# Patient Record
Sex: MALE | Race: WHITE | NOT HISPANIC OR LATINO | Employment: FULL TIME | URBAN - METROPOLITAN AREA
[De-identification: names, ages, dates, MRNs, and addresses within clinical notes are randomized per-mention and may not be internally consistent; named-entity substitution may affect disease eponyms.]

---

## 2017-11-15 ENCOUNTER — HOSPITAL ENCOUNTER (OUTPATIENT)
Dept: RADIOLOGY | Facility: HOSPITAL | Age: 42
Discharge: HOME | End: 2017-11-15
Attending: INTERNAL MEDICINE

## 2017-11-15 ENCOUNTER — OFFICE VISIT - HEALTHEAST (OUTPATIENT)
Dept: RHEUMATOLOGY | Facility: CLINIC | Age: 42
End: 2017-11-15

## 2017-11-15 DIAGNOSIS — M05.79 SEROPOSITIVE RHEUMATOID ARTHRITIS OF MULTIPLE SITES (H): ICD-10-CM

## 2017-11-15 LAB
ALT SERPL W P-5'-P-CCNC: 18 U/L (ref 0–45)
CREAT SERPL-MCNC: 1.05 MG/DL (ref 0.7–1.3)
GFR SERPL CREATININE-BSD FRML MDRD: >60 ML/MIN/1.73M2

## 2017-11-16 LAB
ANA SER QL: 0.3 U
HBV SURFACE AG SERPL QL IA: NEGATIVE
HCV AB SERPL QL IA: NEGATIVE

## 2017-11-20 ENCOUNTER — RECORDS - HEALTHEAST (OUTPATIENT)
Dept: ADMINISTRATIVE | Facility: OTHER | Age: 42
End: 2017-11-20

## 2018-03-06 ENCOUNTER — OFFICE VISIT - HEALTHEAST (OUTPATIENT)
Dept: RHEUMATOLOGY | Facility: CLINIC | Age: 43
End: 2018-03-06

## 2018-03-06 DIAGNOSIS — M19.232 OTHER SECONDARY OSTEOARTHRITIS OF LEFT WRIST: ICD-10-CM

## 2018-03-06 DIAGNOSIS — M05.79 SEROPOSITIVE RHEUMATOID ARTHRITIS OF MULTIPLE SITES (H): ICD-10-CM

## 2018-03-06 DIAGNOSIS — Z79.899 HIGH RISK MEDICATION USE: ICD-10-CM

## 2018-03-06 ASSESSMENT — MIFFLIN-ST. JEOR: SCORE: 1870.09

## 2018-03-12 ENCOUNTER — COMMUNICATION - HEALTHEAST (OUTPATIENT)
Dept: ADMINISTRATIVE | Facility: CLINIC | Age: 43
End: 2018-03-12

## 2018-09-06 ENCOUNTER — COMMUNICATION - HEALTHEAST (OUTPATIENT)
Dept: RHEUMATOLOGY | Facility: CLINIC | Age: 43
End: 2018-09-06

## 2018-09-06 DIAGNOSIS — M05.79 SEROPOSITIVE RHEUMATOID ARTHRITIS OF MULTIPLE SITES (H): ICD-10-CM

## 2018-10-18 ENCOUNTER — OFFICE VISIT - HEALTHEAST (OUTPATIENT)
Dept: RHEUMATOLOGY | Facility: CLINIC | Age: 43
End: 2018-10-18

## 2018-10-18 DIAGNOSIS — M19.232 OTHER SECONDARY OSTEOARTHRITIS OF LEFT WRIST: ICD-10-CM

## 2018-10-18 DIAGNOSIS — Z79.899 HIGH RISK MEDICATION USE: ICD-10-CM

## 2018-10-18 DIAGNOSIS — M06.00 SERONEGATIVE RHEUMATOID ARTHRITIS (H): ICD-10-CM

## 2018-11-08 ENCOUNTER — AMBULATORY - HEALTHEAST (OUTPATIENT)
Dept: RHEUMATOLOGY | Facility: CLINIC | Age: 43
End: 2018-11-08

## 2018-11-08 ENCOUNTER — AMBULATORY - HEALTHEAST (OUTPATIENT)
Dept: LAB | Facility: CLINIC | Age: 43
End: 2018-11-08

## 2018-11-08 DIAGNOSIS — M06.00 SERONEGATIVE RHEUMATOID ARTHRITIS (H): ICD-10-CM

## 2018-11-08 LAB
ALBUMIN SERPL-MCNC: 3.9 G/DL (ref 3.5–5)
ALT SERPL W P-5'-P-CCNC: 28 U/L (ref 0–45)
CREAT SERPL-MCNC: 1.02 MG/DL (ref 0.7–1.3)
ERYTHROCYTE [DISTWIDTH] IN BLOOD BY AUTOMATED COUNT: 13.5 % (ref 11–14.5)
GFR SERPL CREATININE-BSD FRML MDRD: >60 ML/MIN/1.73M2
HCT VFR BLD AUTO: 42.4 % (ref 40–54)
HGB BLD-MCNC: 14.7 G/DL (ref 14–18)
MCH RBC QN AUTO: 30.7 PG (ref 27–34)
MCHC RBC AUTO-ENTMCNC: 34.7 G/DL (ref 32–36)
MCV RBC AUTO: 89 FL (ref 80–100)
PLATELET # BLD AUTO: 234 THOU/UL (ref 140–440)
PMV BLD AUTO: 10.7 FL (ref 8.5–12.5)
RBC # BLD AUTO: 4.79 MILL/UL (ref 4.4–6.2)
WBC: 8.3 THOU/UL (ref 4–11)

## 2018-12-19 ENCOUNTER — OFFICE VISIT - HEALTHEAST (OUTPATIENT)
Dept: RHEUMATOLOGY | Facility: CLINIC | Age: 43
End: 2018-12-19

## 2018-12-19 DIAGNOSIS — M05.79 SEROPOSITIVE RHEUMATOID ARTHRITIS OF MULTIPLE SITES (H): ICD-10-CM

## 2018-12-19 DIAGNOSIS — M19.232 OTHER SECONDARY OSTEOARTHRITIS OF LEFT WRIST: ICD-10-CM

## 2018-12-19 DIAGNOSIS — M06.00 SERONEGATIVE RHEUMATOID ARTHRITIS (H): ICD-10-CM

## 2018-12-19 DIAGNOSIS — Z79.899 HIGH RISK MEDICATION USE: ICD-10-CM

## 2019-01-15 ENCOUNTER — OFFICE VISIT - HEALTHEAST (OUTPATIENT)
Dept: RHEUMATOLOGY | Facility: CLINIC | Age: 44
End: 2019-01-15

## 2019-01-15 DIAGNOSIS — M19.232 OTHER SECONDARY OSTEOARTHRITIS OF LEFT WRIST: ICD-10-CM

## 2019-01-15 DIAGNOSIS — Z79.899 HIGH RISK MEDICATION USE: ICD-10-CM

## 2019-01-15 DIAGNOSIS — M06.00 SERONEGATIVE RHEUMATOID ARTHRITIS (H): ICD-10-CM

## 2019-01-16 ENCOUNTER — COMMUNICATION - HEALTHEAST (OUTPATIENT)
Dept: RHEUMATOLOGY | Facility: CLINIC | Age: 44
End: 2019-01-16

## 2019-01-16 DIAGNOSIS — M06.00 SERONEGATIVE RHEUMATOID ARTHRITIS (H): ICD-10-CM

## 2019-03-04 ENCOUNTER — COMMUNICATION - HEALTHEAST (OUTPATIENT)
Dept: ADMINISTRATIVE | Facility: CLINIC | Age: 44
End: 2019-03-04

## 2019-05-23 ENCOUNTER — OFFICE VISIT - HEALTHEAST (OUTPATIENT)
Dept: RHEUMATOLOGY | Facility: CLINIC | Age: 44
End: 2019-05-23

## 2019-05-23 DIAGNOSIS — Z79.899 HIGH RISK MEDICATION USE: ICD-10-CM

## 2019-05-23 DIAGNOSIS — M06.00 SERONEGATIVE RHEUMATOID ARTHRITIS (H): ICD-10-CM

## 2019-05-23 DIAGNOSIS — M19.232 OTHER SECONDARY OSTEOARTHRITIS OF LEFT WRIST: ICD-10-CM

## 2019-10-08 ENCOUNTER — OFFICE VISIT - HEALTHEAST (OUTPATIENT)
Dept: RHEUMATOLOGY | Facility: CLINIC | Age: 44
End: 2019-10-08

## 2019-10-08 DIAGNOSIS — M06.00 SERONEGATIVE RHEUMATOID ARTHRITIS (H): ICD-10-CM

## 2019-10-08 DIAGNOSIS — M19.232 OTHER SECONDARY OSTEOARTHRITIS OF LEFT WRIST: ICD-10-CM

## 2019-10-08 DIAGNOSIS — Z79.899 HIGH RISK MEDICATION USE: ICD-10-CM

## 2019-10-08 LAB
ALBUMIN SERPL-MCNC: 4.1 G/DL (ref 3.5–5)
ALT SERPL W P-5'-P-CCNC: 22 U/L (ref 0–45)
CREAT SERPL-MCNC: 1.16 MG/DL (ref 0.7–1.3)
ERYTHROCYTE [DISTWIDTH] IN BLOOD BY AUTOMATED COUNT: 11.1 % (ref 11–14.5)
GFR SERPL CREATININE-BSD FRML MDRD: >60 ML/MIN/1.73M2
HCT VFR BLD AUTO: 49.3 % (ref 40–54)
HGB BLD-MCNC: 16.8 G/DL (ref 14–18)
MCH RBC QN AUTO: 30.6 PG (ref 27–34)
MCHC RBC AUTO-ENTMCNC: 34 G/DL (ref 32–36)
MCV RBC AUTO: 90 FL (ref 80–100)
PLATELET # BLD AUTO: 238 THOU/UL (ref 140–440)
PMV BLD AUTO: 8.7 FL (ref 7–10)
RBC # BLD AUTO: 5.48 MILL/UL (ref 4.4–6.2)
WBC: 7.4 THOU/UL (ref 4–11)

## 2019-10-08 ASSESSMENT — MIFFLIN-ST. JEOR: SCORE: 1941.31

## 2019-10-16 ENCOUNTER — COMMUNICATION - HEALTHEAST (OUTPATIENT)
Dept: RHEUMATOLOGY | Facility: CLINIC | Age: 44
End: 2019-10-16

## 2019-10-16 DIAGNOSIS — M06.00 SERONEGATIVE RHEUMATOID ARTHRITIS (H): ICD-10-CM

## 2019-11-07 ENCOUNTER — COMMUNICATION - HEALTHEAST (OUTPATIENT)
Dept: RHEUMATOLOGY | Facility: CLINIC | Age: 44
End: 2019-11-07

## 2019-11-07 DIAGNOSIS — M06.00 SERONEGATIVE RHEUMATOID ARTHRITIS (H): ICD-10-CM

## 2020-02-26 ENCOUNTER — OFFICE VISIT - HEALTHEAST (OUTPATIENT)
Dept: RHEUMATOLOGY | Facility: CLINIC | Age: 45
End: 2020-02-26

## 2020-02-26 DIAGNOSIS — Z79.899 HIGH RISK MEDICATION USE: ICD-10-CM

## 2020-02-26 DIAGNOSIS — M19.232 OTHER SECONDARY OSTEOARTHRITIS OF LEFT WRIST: ICD-10-CM

## 2020-02-26 DIAGNOSIS — M06.00 SERONEGATIVE RHEUMATOID ARTHRITIS (H): ICD-10-CM

## 2020-02-26 LAB
ALBUMIN SERPL-MCNC: 4.1 G/DL (ref 3.5–5)
ALT SERPL W P-5'-P-CCNC: 21 U/L (ref 0–45)
CREAT SERPL-MCNC: 1.14 MG/DL (ref 0.7–1.3)
ERYTHROCYTE [DISTWIDTH] IN BLOOD BY AUTOMATED COUNT: 11.5 % (ref 11–14.5)
GFR SERPL CREATININE-BSD FRML MDRD: >60 ML/MIN/1.73M2
HCT VFR BLD AUTO: 44.4 % (ref 40–54)
HGB BLD-MCNC: 15.1 G/DL (ref 14–18)
MCH RBC QN AUTO: 30.6 PG (ref 27–34)
MCHC RBC AUTO-ENTMCNC: 34.1 G/DL (ref 32–36)
MCV RBC AUTO: 90 FL (ref 80–100)
PLATELET # BLD AUTO: 229 THOU/UL (ref 140–440)
PMV BLD AUTO: 9.1 FL (ref 7–10)
RBC # BLD AUTO: 4.94 MILL/UL (ref 4.4–6.2)
WBC: 7.1 THOU/UL (ref 4–11)

## 2020-08-24 ENCOUNTER — AMBULATORY - HEALTHEAST (OUTPATIENT)
Dept: LAB | Facility: CLINIC | Age: 45
End: 2020-08-24

## 2020-08-24 DIAGNOSIS — M06.00 SERONEGATIVE RHEUMATOID ARTHRITIS (H): ICD-10-CM

## 2020-08-24 DIAGNOSIS — Z79.899 HIGH RISK MEDICATION USE: ICD-10-CM

## 2020-08-24 LAB
ALBUMIN SERPL-MCNC: 4.1 G/DL (ref 3.5–5)
ALT SERPL W P-5'-P-CCNC: 22 U/L (ref 0–45)
CREAT SERPL-MCNC: 1.13 MG/DL (ref 0.7–1.3)
ERYTHROCYTE [DISTWIDTH] IN BLOOD BY AUTOMATED COUNT: 11.7 % (ref 11–14.5)
GFR SERPL CREATININE-BSD FRML MDRD: >60 ML/MIN/1.73M2
HCT VFR BLD AUTO: 43.3 % (ref 40–54)
HGB BLD-MCNC: 14.9 G/DL (ref 14–18)
MCH RBC QN AUTO: 30.8 PG (ref 27–34)
MCHC RBC AUTO-ENTMCNC: 34.5 G/DL (ref 32–36)
MCV RBC AUTO: 89 FL (ref 80–100)
PLATELET # BLD AUTO: 246 THOU/UL (ref 140–440)
PMV BLD AUTO: 8.8 FL (ref 7–10)
RBC # BLD AUTO: 4.85 MILL/UL (ref 4.4–6.2)
WBC: 9.6 THOU/UL (ref 4–11)

## 2020-09-29 ENCOUNTER — OFFICE VISIT - HEALTHEAST (OUTPATIENT)
Dept: RHEUMATOLOGY | Facility: CLINIC | Age: 45
End: 2020-09-29

## 2020-09-29 DIAGNOSIS — M19.232 OTHER SECONDARY OSTEOARTHRITIS OF LEFT WRIST: ICD-10-CM

## 2020-09-29 DIAGNOSIS — M06.00 SERONEGATIVE RHEUMATOID ARTHRITIS (H): ICD-10-CM

## 2020-09-29 DIAGNOSIS — Z79.899 HIGH RISK MEDICATION USE: ICD-10-CM

## 2020-09-30 ENCOUNTER — COMMUNICATION - HEALTHEAST (OUTPATIENT)
Dept: RHEUMATOLOGY | Facility: CLINIC | Age: 45
End: 2020-09-30

## 2021-01-08 ENCOUNTER — COMMUNICATION - HEALTHEAST (OUTPATIENT)
Dept: RHEUMATOLOGY | Facility: CLINIC | Age: 46
End: 2021-01-08

## 2021-03-22 ENCOUNTER — COMMUNICATION - HEALTHEAST (OUTPATIENT)
Dept: LAB | Facility: CLINIC | Age: 46
End: 2021-03-22

## 2021-03-22 DIAGNOSIS — M06.00 SERONEGATIVE RHEUMATOID ARTHRITIS (H): ICD-10-CM

## 2021-04-13 ENCOUNTER — OFFICE VISIT - HEALTHEAST (OUTPATIENT)
Dept: RHEUMATOLOGY | Facility: CLINIC | Age: 46
End: 2021-04-13

## 2021-04-13 DIAGNOSIS — M06.00 SERONEGATIVE RHEUMATOID ARTHRITIS (H): ICD-10-CM

## 2021-04-13 DIAGNOSIS — Z79.899 HIGH RISK MEDICATION USE: ICD-10-CM

## 2021-04-13 DIAGNOSIS — M19.232 OTHER SECONDARY OSTEOARTHRITIS OF LEFT WRIST: ICD-10-CM

## 2021-04-13 RX ORDER — NAPROXEN SODIUM 220 MG
220 TABLET ORAL 2 TIMES DAILY WITH MEALS
Status: SHIPPED | COMMUNITY
Start: 2021-04-13

## 2021-04-13 RX ORDER — ABATACEPT 125 MG/ML
125 INJECTION, SOLUTION SUBCUTANEOUS
Qty: 4 ML | Refills: 5 | Status: SHIPPED | OUTPATIENT
Start: 2021-04-13 | End: 2022-02-14

## 2021-04-19 ENCOUNTER — AMBULATORY - HEALTHEAST (OUTPATIENT)
Dept: LAB | Facility: CLINIC | Age: 46
End: 2021-04-19

## 2021-04-19 DIAGNOSIS — M06.00 SERONEGATIVE RHEUMATOID ARTHRITIS (H): ICD-10-CM

## 2021-04-19 LAB
ALBUMIN SERPL-MCNC: 4.1 G/DL (ref 3.5–5)
ALT SERPL W P-5'-P-CCNC: 19 U/L (ref 0–45)
CREAT SERPL-MCNC: 1.01 MG/DL (ref 0.7–1.3)
ERYTHROCYTE [DISTWIDTH] IN BLOOD BY AUTOMATED COUNT: 13.4 % (ref 11–14.5)
GFR SERPL CREATININE-BSD FRML MDRD: >60 ML/MIN/1.73M2
HCT VFR BLD AUTO: 42.2 % (ref 40–54)
HGB BLD-MCNC: 14.5 G/DL (ref 14–18)
MCH RBC QN AUTO: 29.8 PG (ref 27–34)
MCHC RBC AUTO-ENTMCNC: 34.4 G/DL (ref 32–36)
MCV RBC AUTO: 87 FL (ref 80–100)
PLATELET # BLD AUTO: 253 THOU/UL (ref 140–440)
PMV BLD AUTO: 10.6 FL (ref 8.5–12.5)
RBC # BLD AUTO: 4.87 MILL/UL (ref 4.4–6.2)
WBC: 8.6 THOU/UL (ref 4–11)

## 2021-05-29 NOTE — PROGRESS NOTES
ASSESSMENT AND PLAN:  Blaine Washington 43 y.o. male is seen here on 01/15/19 for follow-up of rheumatoid arthritis, which is seronegative, with predominant involvement of the left wrist.  He is not been on Orencia for the past nearly 2 months.  He feels that the pain has not improved yet.  He he felt the best when he was on 15 mg of prednisone virtually asymptomatic.  Previously on Humira that did not seem to provide him sufficient relief.  We discussed options.  He has been taking Aleve.  Rather have him take a low-dose prednisone which is prescribed today, follow-up here in 3 months while continuing Orencia as now.          Blaine was seen today for follow-up.    Seronegative rheumatoid arthritis (H)  -     predniSONE (DELTASONE) 2.5 MG tablet; 7.5 mg/d prednisone PO for 1 month, reduce to 5 mg/d for the next month, and then reduce to 2.5 mg/d for the third month and then stop..    Other secondary osteoarthritis of left wrist    High risk medication use          HISTORY OF PRESENTING ILLNESS:  Blaine Washington, 43 y.o., male is here for follow-up.  He has seronegative rheumatoid arthritis predominantly affecting his left wrist.  After having taken Humira for adequate amount of time he did not improve.  He responded very nicely to prednisone 15 mg daily.  This was to the point where he did not have residual pain or swelling stiffness in the left wrist on his previous visit.  Then he was started on Orencia.  His MRI was consistent with active inflammation and synovitis.  He has had 2 months of Orencia.  He is not sure what this if anything has helped with the pain and swelling stiffness but then he has been little bit more active.  He rated the pain level at 4.0/10, with some interference in day-to-day activities, morning stiffness of no more than 15 minutes or so.  For further definition of the pathology MRI was done which shows extensive damage to multiple joints and bones around the wrist.  It also  suggests that he may have ongoing, active synovitis.  These findings are reviewed with him.  Today he noted it was a better day.  Other days he has been more more bothered by the pain.    This is over the years been \ correction as as rheumatoid arthritis, seronegative.  There are no associated comorbidities which are relevant is such as psoriasis, ulcerative colitis Crohn's disease himself or the family.  He reports residual discomfort in his left wrist which comes on after he has been using it more than usual suggest mowing the loss.  On his previous visit we had mentioned further intensification of treatment.  He was due to come back in 2 months.  This was during the conversation 7 months ago.  He noted little or no pain at rest in the left wrist.  No other joint areas similarly affected. He is reporting morning stiffness of his limited to 5 minutes.  There is no fever or weight loss blurry vision eye redness mouth sores or nausea, there is no rash.  .  He had an MRI of the left wrist on the 7-12 that showed multiple erosions in the carpal bones.. He is currently on Humira.  He feels that he is able to do the injections most of the time was approximately 75% of the times.  Occasionally when he misses the wrist gets more tender and swollen.  He has very limited range of motion.  At one point it sounds like arthrodesis of the wrist or injection with corticosteroid were discussed with him.  At that point and even today he feels that that is something he may want to defer for now.  He has noted once again that there are no other areas of pain.  He noted pain to be mild in the left wrist with 4.0/10.  In the morning his stiffness is 2-3 minutes.  He has no history of psoriasis himself or the family of ulcerative colitis Crohn's disease.  There is no family history of rheumatoid arthritis or lupus.  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  He has an active lifestyle, he has 2 young  boys.  He takes 5-10 drinks of alcohol variably per week.  He is not a smoker.  He describes himself otherwise in good health.  He has taken Aleve which has provided some relief in the past first 6 6 years or so he was on sulfasalazine which may have provided some relief but not all that impressed.  He has been on Humira for nearly 9 years now.   Further historical information and ADL limitations as noted in the multidimensional health assessment questionnaire attached in the EMR.   ALLERGIES:Patient has no known allergies.    PAST MEDICAL/ACTIVE PROBLEMS/MEDICATION/ FAMILY HISTORY/SOCIAL DATA:  The patient has a family history of  No past medical history on file.  Social History     Tobacco Use   Smoking Status Never Smoker   Smokeless Tobacco Never Used     Patient Active Problem List   Diagnosis     Arthritis of wrist, left, degenerative     High risk medication use     Seronegative rheumatoid arthritis (H)     Current Outpatient Medications   Medication Sig Dispense Refill     adalimumab (HUMIRA PEN) 40 mg/0.8 mL PnKt Inject 40 mg under the skin every 14 (fourteen) days. 1 kit 1     adalimumab (HUMIRA) 40 mg/0.8 mL injection Inject 0.8 mL (40 mg total) under the skin every 14 (fourteen) days. 4.8 mL 1     predniSONE (DELTASONE) 10 mg tablet Take 15 mg by mouth daily. 45 tablet 0     No current facility-administered medications for this visit.      DETAILED EXAMINATION  01/15/19  :  Vitals:    01/15/19 1633 01/15/19 1651   BP: (!) 144/92 134/90   Patient Site: Right Arm Right Arm   Patient Position: Sitting Sitting   Cuff Size: Adult Large Adult Large   Pulse: 80    Weight: (!) 235 lb (106.6 kg)      Alert oriented. Head including the face is examined for malar rash, heliotropes, scarring, lupus pernio. Eyes examined for redness such as in episcleritis/scleritis, periorbital lesions.   Neck/ Face examined for parotid gland swelling, range of motion of neck.  Left upper and lower and right upper and lower  extremities examined for tenderness, swelling, warmth of the appendicular joints, range of motion, edema, rash.  Some of the important findings included: He has warmth and swelling and tenderness of his left wrist more so dorsally.. No other palpable joints of upper extremities similarly affected, knees without effusion warmth or JLT.     LAB / IMAGING DATA:  ALT   Date Value Ref Range Status   11/08/2018 28 0 - 45 U/L Final   12/21/2017 23 0 - 45 U/L Final   11/15/2017 18 0 - 45 U/L Final     Albumin   Date Value Ref Range Status   11/08/2018 3.9 3.5 - 5.0 g/dL Final   12/21/2017 4.0 3.5 - 5.0 g/dL Final   11/15/2017 4.1 3.5 - 5.0 g/dL Final     Creatinine   Date Value Ref Range Status   11/08/2018 1.02 0.70 - 1.30 mg/dL Final   12/21/2017 0.96 0.70 - 1.30 mg/dL Final   11/15/2017 1.05 0.70 - 1.30 mg/dL Final       WBC   Date Value Ref Range Status   11/08/2018 8.3 4.0 - 11.0 thou/uL Final   11/15/2017 6.8 4.0 - 11.0 thou/uL Final     Hemoglobin   Date Value Ref Range Status   11/08/2018 14.7 14.0 - 18.0 g/dL Final   11/15/2017 14.9 14.0 - 18.0 g/dL Final   10/10/2016 15.3 14.0 - 18.0 g/dL Final     Platelets   Date Value Ref Range Status   11/08/2018 234 140 - 440 thou/uL Final   11/15/2017 212 140 - 440 thou/uL Final   10/10/2016 219 140 - 440 thou/uL Final       Lab Results   Component Value Date    RF <15.0 11/15/2017    SEDRATE 12 11/15/2017

## 2021-05-31 VITALS — WEIGHT: 225 LBS

## 2021-06-01 VITALS — BODY MASS INDEX: 31.21 KG/M2 | WEIGHT: 218 LBS | HEIGHT: 70 IN

## 2021-06-02 VITALS — WEIGHT: 235 LBS | BODY MASS INDEX: 33.72 KG/M2

## 2021-06-02 VITALS — BODY MASS INDEX: 33.29 KG/M2 | WEIGHT: 232 LBS

## 2021-06-02 VITALS — BODY MASS INDEX: 33.72 KG/M2 | WEIGHT: 235 LBS

## 2021-06-02 NOTE — PROGRESS NOTES
ASSESSMENT AND PLAN:  Blaine Washington 43 y.o. male is seen here on 01/15/19 for follow-up of rheumatoid arthritis, which is seronegative, with predominant involvement of the left wrist.  After having taken Humira for several years, he has been on Orencia for the past nearly 6 months.  He feels that the pain has improved significantly, some stiffness especially after yard work.  He is no longer on prednisone.  He has residual synovitis in the left wrist.  We discussed the option of methotrexate.  We will check labs today, and depending upon how he is doing this option may need to be exercised on the next visit in 3 months.           Blaine was seen today for follow-up.    Seronegative rheumatoid arthritis (H)  -     ALT (SGPT); Standing  -     Albumin; Standing  -     Creatinine; Standing  -     HM2(CBC w/o Differential); Standing    High risk medication use  -     ALT (SGPT); Standing  -     Albumin; Standing  -     Creatinine; Standing  -     HM2(CBC w/o Differential); Standing    Other secondary osteoarthritis of left wrist          HISTORY OF PRESENTING ILLNESS:  Baline Washington, 43 y.o., male is here for follow-up.  He has seronegative rheumatoid arthritis predominantly affecting his left wrist.  And he feels that finally the Orencia seems to have helped him.  He is more impressed by how much pain improvement he is experience.  However he still gets stiffness especially after using his hands such as yard work.  He noted pain level now to be at 1.5/10 which is a significant improvement from the previous visit where it was rated around a 4.  He has noted an improved ability to daily various day-to-day activities no other joint areas are affected.  The morning stiffness is down to 5 minutes.  He is no longer on prednisone.  He is at more than 6 months of Orencia now..  For further definition of the pathology MRI was done which shows extensive damage to multiple joints and bones around the wrist.   It also suggests that he may have ongoing, active synovitis.  These findings are reviewed with him.  Today he noted it was a better day.  Other days he has been more more bothered by the pain.   .  There is no family history of rheumatoid arthritis or lupus.  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  He has an active lifestyle, he has 2 young boys.  He takes 5-10 drinks of alcohol variably per week.  He is not a smoker.  He describes himself otherwise in good health.  He has taken Aleve which has provided some relief in the past first 6 6 years or so he was on sulfasalazine which may have provided some relief but not all that impressed.  Further historical information and ADL limitations as noted in the multidimensional health assessment questionnaire attached in the EMR.   ALLERGIES:Patient has no known allergies.    PAST MEDICAL/ACTIVE PROBLEMS/MEDICATION/ FAMILY HISTORY/SOCIAL DATA:  The patient has a family history of  No past medical history on file.  Social History     Tobacco Use   Smoking Status Never Smoker   Smokeless Tobacco Never Used     Patient Active Problem List   Diagnosis     Arthritis of wrist, left, degenerative     High risk medication use     Seronegative rheumatoid arthritis (H)     Current Outpatient Medications   Medication Sig Dispense Refill     adalimumab (HUMIRA PEN) 40 mg/0.8 mL PnKt Inject 40 mg under the skin every 14 (fourteen) days. 1 kit 1     adalimumab (HUMIRA) 40 mg/0.8 mL injection Inject 0.8 mL (40 mg total) under the skin every 14 (fourteen) days. 4.8 mL 1     predniSONE (DELTASONE) 10 mg tablet Take 15 mg by mouth daily. 45 tablet 0     No current facility-administered medications for this visit.      DETAILED EXAMINATION  01/15/19  :  Vitals:    01/15/19 1633 01/15/19 1651   BP: (!) 144/92 134/90   Patient Site: Right Arm Right Arm   Patient Position: Sitting Sitting   Cuff Size: Adult Large Adult Large   Pulse: 80    Weight: (!) 235 lb  (106.6 kg)      Alert oriented. Head including the face is examined for malar rash, heliotropes, scarring, lupus pernio. Eyes examined for redness such as in episcleritis/scleritis, periorbital lesions.   Neck/ Face examined for parotid gland swelling, range of motion of neck.  Left upper and lower and right upper and lower extremities examined for tenderness, swelling, warmth of the appendicular joints, range of motion, edema, rash.  Some of the important findings included: Residual subtle swelling on the left wrist, minimally tender.  None of the rest of the palpable upper extremity joints show synovitis, knees without effusion warmth or JLT.     LAB / IMAGING DATA:  ALT   Date Value Ref Range Status   11/08/2018 28 0 - 45 U/L Final   12/21/2017 23 0 - 45 U/L Final   11/15/2017 18 0 - 45 U/L Final     Albumin   Date Value Ref Range Status   11/08/2018 3.9 3.5 - 5.0 g/dL Final   12/21/2017 4.0 3.5 - 5.0 g/dL Final   11/15/2017 4.1 3.5 - 5.0 g/dL Final     Creatinine   Date Value Ref Range Status   11/08/2018 1.02 0.70 - 1.30 mg/dL Final   12/21/2017 0.96 0.70 - 1.30 mg/dL Final   11/15/2017 1.05 0.70 - 1.30 mg/dL Final       WBC   Date Value Ref Range Status   11/08/2018 8.3 4.0 - 11.0 thou/uL Final   11/15/2017 6.8 4.0 - 11.0 thou/uL Final     Hemoglobin   Date Value Ref Range Status   11/08/2018 14.7 14.0 - 18.0 g/dL Final   11/15/2017 14.9 14.0 - 18.0 g/dL Final   10/10/2016 15.3 14.0 - 18.0 g/dL Final     Platelets   Date Value Ref Range Status   11/08/2018 234 140 - 440 thou/uL Final   11/15/2017 212 140 - 440 thou/uL Final   10/10/2016 219 140 - 440 thou/uL Final       Lab Results   Component Value Date    RF <15.0 11/15/2017    SEDRATE 12 11/15/2017

## 2021-06-03 VITALS — BODY MASS INDEX: 33.72 KG/M2 | WEIGHT: 235 LBS

## 2021-06-03 VITALS
HEIGHT: 70 IN | HEART RATE: 60 BPM | SYSTOLIC BLOOD PRESSURE: 138 MMHG | DIASTOLIC BLOOD PRESSURE: 94 MMHG | BODY MASS INDEX: 33.46 KG/M2 | WEIGHT: 233.7 LBS

## 2021-06-04 VITALS
WEIGHT: 235 LBS | BODY MASS INDEX: 33.72 KG/M2 | HEART RATE: 84 BPM | SYSTOLIC BLOOD PRESSURE: 140 MMHG | DIASTOLIC BLOOD PRESSURE: 100 MMHG

## 2021-06-06 NOTE — PROGRESS NOTES
ASSESSMENT AND PLAN:  Blaine Washington 43 y.o. male is seen here on 01/15/19 for follow-up.  He has seronegative rheumatoid with secondary moderate to severe damage to the left wrist, with loss of cartilage, he has done well with Orencia, after the secondary failure of Humira.  He is virtually asymptomatic apart from reduced range of motion at the left wrist.  He is due for labs today.  We will continue Orencia that he injects every week.  Literature on methotrexate is provided.  He will review this and decide how he wants to proceed.  We will revisit this in subsequent visits.  We will meet here in 6 months.               Blaine was seen today for follow-up.    Seronegative rheumatoid arthritis (H)    Other secondary osteoarthritis of left wrist    High risk medication use          HISTORY OF PRESENTING ILLNESS:  Blaine Washington, 43 y.o., male is here for follow-up.  He has seronegative rheumatoid arthritis predominantly affecting his left wrist.  And he feels that finally the Orencia seems to have helped him.  He did noted pain level of 0.5/10.  There is hardly any restriction in his day-to-day activities.  He does take over-the-counter Aleve prior to doing some heavy work such as shoveling.  Occasionally using a towel for example certain movements causes discomfort in the left wrist.  He does have significant reduction in the range of motion.  MRI in the past have shown severe to moderate the severe erosive damage to the cartilage and carpal bones..  There is no family history of rheumatoid arthritis or lupus.  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  He has an active lifestyle, he has 2 young boys.  He takes 5-10 drinks of alcohol variably per week.  He is not a smoker.  He used to be on Humira, for several years.  He has recurrent tenderness at briefly as well.  He describes himself otherwise in good health.  He has taken Aleve which has provided some relief in  the past first 6 6 years or so he was on sulfasalazine which may have provided some relief but not all that impressed.  Further historical information and ADL limitations as noted in the multidimensional health assessment questionnaire attached in the EMR.             Moderate to severe diffuse cartilage loss in the distal radial ulnar,  radiocarpal, and intercarpal joints. Milder cartilage loss at the carpal  metacarpal joints. Juxta-articular erosive change involving numerous bones  including the radial aspect scaphoid, radial styloid, distal pole scaphoid and  trapezium, distal trapezoid, capitate, and ulnar side of the hamate. Juxta  articular marrow edema involves the index through small finger metacarpal bases  and all of the carpal bones as well as the ulnar styloid and radial styloid.  Joint effusions and likely synovitis at the distal radial ulnar, radiocarpal,  intercarpal and carpometacarpal joints.      ALLERGIES:Patient has no known allergies.    PAST MEDICAL/ACTIVE PROBLEMS/MEDICATION/ FAMILY HISTORY/SOCIAL DATA:  The patient has a family history of  No past medical history on file.  Social History     Tobacco Use   Smoking Status Never Smoker   Smokeless Tobacco Never Used     Patient Active Problem List   Diagnosis     Arthritis of wrist, left, degenerative     High risk medication use     Seronegative rheumatoid arthritis (H)     Current Outpatient Medications   Medication Sig Dispense Refill     adalimumab (HUMIRA PEN) 40 mg/0.8 mL PnKt Inject 40 mg under the skin every 14 (fourteen) days. 1 kit 1     adalimumab (HUMIRA) 40 mg/0.8 mL injection Inject 0.8 mL (40 mg total) under the skin every 14 (fourteen) days. 4.8 mL 1     predniSONE (DELTASONE) 10 mg tablet Take 15 mg by mouth daily. 45 tablet 0     No current facility-administered medications for this visit.      DETAILED EXAMINATION  01/15/19  :  Vitals:    01/15/19 1633 01/15/19 1651   BP: (!) 144/92 134/90   Patient Site: Right Arm Right Arm    Patient Position: Sitting Sitting   Cuff Size: Adult Large Adult Large   Pulse: 80    Weight: (!) 235 lb (106.6 kg)      Alert oriented. Head including the face is examined for malar rash, heliotropes, scarring, lupus pernio. Eyes examined for redness such as in episcleritis/scleritis, periorbital lesions.   Neck/ Face examined for parotid gland swelling, range of motion of neck.  Left upper and lower and right upper and lower extremities examined for tenderness, swelling, warmth of the appendicular joints, range of motion, edema, rash.  Some of the important findings included: Residual subtle swelling on the left wrist, minimally tender.  Reduced range of motion of the left wrist both in extension and flexion.  None of the rest of the palpable upper extremity joints show synovitis, knees without effusion warmth or JLT.     LAB / IMAGING DATA:  ALT   Date Value Ref Range Status   11/08/2018 28 0 - 45 U/L Final   12/21/2017 23 0 - 45 U/L Final   11/15/2017 18 0 - 45 U/L Final     Albumin   Date Value Ref Range Status   11/08/2018 3.9 3.5 - 5.0 g/dL Final   12/21/2017 4.0 3.5 - 5.0 g/dL Final   11/15/2017 4.1 3.5 - 5.0 g/dL Final     Creatinine   Date Value Ref Range Status   11/08/2018 1.02 0.70 - 1.30 mg/dL Final   12/21/2017 0.96 0.70 - 1.30 mg/dL Final   11/15/2017 1.05 0.70 - 1.30 mg/dL Final       WBC   Date Value Ref Range Status   11/08/2018 8.3 4.0 - 11.0 thou/uL Final   11/15/2017 6.8 4.0 - 11.0 thou/uL Final     Hemoglobin   Date Value Ref Range Status   11/08/2018 14.7 14.0 - 18.0 g/dL Final   11/15/2017 14.9 14.0 - 18.0 g/dL Final   10/10/2016 15.3 14.0 - 18.0 g/dL Final     Platelets   Date Value Ref Range Status   11/08/2018 234 140 - 440 thou/uL Final   11/15/2017 212 140 - 440 thou/uL Final   10/10/2016 219 140 - 440 thou/uL Final       Lab Results   Component Value Date    RF <15.0 11/15/2017    SEDRATE 12 11/15/2017

## 2021-06-11 NOTE — PROGRESS NOTES
1st attempt   [facility]- to go through rooming questions. Will try again later     Jannet Mattson CMA [facility] 9/29/2020 12:59 PM

## 2021-06-11 NOTE — PROGRESS NOTES
"Blaine Washington is a 45 y.o. male who is being evaluated via a billable video visit.      The patient has been notified of following:     \"This video visit will be conducted via a call between you and your physician/provider. We have found that certain health care needs can be provided without the need for an in-person physical exam.  This service lets us provide the care you need with a video conversation.  If a prescription is necessary we can send it directly to your pharmacy.  If lab work is needed we can place an order for that and you can then stop by our lab to have the test done at a later time.    Video visits are billed at different rates depending on your insurance coverage. Please reach out to your insurance provider with any questions.    If during the course of the call the physician/provider feels a video visit is not appropriate, you will not be charged for this service.\"    Patient has given verbal consent to a Video visit? Yes  How would you like to obtain your AVS? AVS Preference: MyChart.  If dropped by the video visit, the video invitation should be sent to: Text to cell phone: [phone]   Will anyone else be joining your video visit? No        Video-Visit Details    Type of service:  Video Visit    Originating Location (pt. Location): Home    Distant Location (provider location):  [facility]     [facility]      ASSESSMENT AND PLAN:    Diagnoses and all orders for this visit:    Seronegative rheumatoid arthritis (H)  -     abatacept (ORENCIA) 125 mg/mL Syrg; Inject 125 mg under the skin every 7 days.  Dispense: 4 mL; Refill: 5    High risk medication use    Other secondary osteoarthritis of left wrist          HISTORY OF PRESENTING ILLNESS:  Blaine Washington 45 y.o. is evaluated here via video link.  This is for follow-up.  He has rheumatoid arthritis.  This is longstanding.  Severe.  Resulted in damage to the left wrist joint with secondary " osteoarthritis.  He was on Humira.  That was not controlling his inflammation.  Since he has been on Orencia he feels significantly better.  He has noted some discomfort in his wrists after more stenosis activity.  He is not woken up from sleep because of this.  MRI in the past have shown severe to moderate the severe erosive damage to the cartilage and carpal bones..  There is no family history of rheumatoid arthritis or lupus.  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  He has an active lifestyle, he has 2 young boys.  He takes 5-10 drinks of alcohol variably per week.  He is not a smoker.  He used to be on Humira, for several years.  He has recurrent tenderness at briefly as well.  He describes himself otherwise in good health.  He has taken Aleve which has provided some relief in the past first 6 6 years or so he was on sulfasalazine which may have provided some relief but not all that impressed.  Further historical information and ADL limitations as noted in the multidimensional health assessment questionnaire attached in the EMR.  He is to follow-up here in 6 months with labs .     ROS enquiry held for fever, ocular symptoms, rash, headache,  GI issues.  Today we also discussed the issues related to the current pandemic, the pros and cons of the current treatment plan, the CDC guidelines such as social distancing washing the hands covering the cough.  ALLERGIES:Patient has no known allergies.    PAST MEDICAL/ACTIVE PROBLEMS/MEDICATION/SOCIAL DATA  No past medical history on file.  Social History     Tobacco Use   Smoking Status Never Smoker   Smokeless Tobacco Never Used     Patient Active Problem List   Diagnosis     Arthritis of wrist, left, degenerative     High risk medication use     Seronegative rheumatoid arthritis (H)     Current Outpatient Medications   Medication Sig Dispense Refill     abatacept (ORENCIA) 125 mg/mL Syrg Inject 125 mg under the skin every 7 days. 4 mL  5     No current facility-administered medications for this visit.          EXAMINATION:    Using the audio and video link as best as possible the constitutional, neck, neurologic, psych, skin, both upper extremities areas/organ system were evaluated during this assessment.  Some of the important findings: He has subtle swelling of the left wrist, no swelling of the digits no dactylitis he is able to make a full fist.  Abduction of the shoulder is normal.      LAB / IMAGING DATA:  ALT   Date Value Ref Range Status   08/24/2020 22 0 - 45 U/L Final   02/26/2020 21 0 - 45 U/L Final   10/08/2019 22 0 - 45 U/L Final     Albumin   Date Value Ref Range Status   08/24/2020 4.1 3.5 - 5.0 g/dL Final   02/26/2020 4.1 3.5 - 5.0 g/dL Final   10/08/2019 4.1 3.5 - 5.0 g/dL Final     Creatinine   Date Value Ref Range Status   08/24/2020 1.13 0.70 - 1.30 mg/dL Final   02/26/2020 1.14 0.70 - 1.30 mg/dL Final   10/08/2019 1.16 0.70 - 1.30 mg/dL Final       WBC   Date Value Ref Range Status   08/24/2020 9.6 4.0 - 11.0 thou/uL Final   02/26/2020 7.1 4.0 - 11.0 thou/uL Final     Hemoglobin   Date Value Ref Range Status   08/24/2020 14.9 14.0 - 18.0 g/dL Final   02/26/2020 15.1 14.0 - 18.0 g/dL Final   10/08/2019 16.8 14.0 - 18.0 g/dL Final     Platelets   Date Value Ref Range Status   08/24/2020 246 140 - 440 thou/uL Final   02/26/2020 229 140 - 440 thou/uL Final   10/08/2019 238 140 - 440 thou/uL Final       Lab Results   Component Value Date    RF <15.0 11/15/2017    SEDRATE 12 11/15/2017     Duration of the call:7  Minutes  Call start: 436  pm  Call end:   443pm

## 2021-06-14 NOTE — PROGRESS NOTES
ASSESSMENT AND PLAN:  Stu Washington 42 y.o. male is seen here on 11/15/17 for establishment of care for seronegative rheumatoid arthritis diagnosed elsewhere on Humira for the past several years.  He he seems to have sustained damage to his wrist over the years.  Further workup as noted.  X-rays of the wrist to be taken labs as noted.  For now stay on Humira.  Tried to hit 100% of the times from 75% uses now.  He may turn out to be candidate for additional support such as with methotrexate.  Once all the data are reviewed further course to be charted accordingly we will meet here in the next 2 months.  Diagnoses and all orders for this visit:    Seropositive rheumatoid arthritis of multiple sites  -     HM1(CBC and Differential)  -     Creatinine  -     ALT (SGPT)  -     Albumin  -     Rheumatoid Factor Quant  -     CCP Antibodies  -     Hepatitis C Antibody (Anti-HCV)  -     QTF-Mycobacterium tuberculosis by QuantiFERON-TB Gold  -     Uric Acid  -     C-Reactive Protein  -     Erythrocyte Sedimentation Rate  -     Antinuclear Antibody (NERI) Cascade  -     Hepatitis B Surface Antigen (HBsAG)  -     XR Wrist Left 3 or More VWS; Future; Expected date: 11/15/17  -     XR Wrist Right 3 or More VWS; Future; Expected date: 11/15/17  -     HM1 (CBC with Diff)      HISTORY OF PRESENTING ILLNESS:  Stu Washington, 42 y.o., male is here for establishment of care for what is been diagnosis seronegative rheumatoid arthritis predominantly affecting his left hand, wrist.  He reports there is hardly any other areas that are affected.  He dates his symptoms back to 2005 when he started hurting, swelling on the left wrist.  It had reached a point where he could not do day-to-day activities and could not drive could not button his chart for example.  The pain was severe.  He was seen in rheumatology.  He was started on DMARD's.  He has over the years had a variety including what sounds like sulfasalazine and  hydroxychloroquine.  He may have had methotrexate to but will look into that further once the data are reviewed.  He had an MRI of the left wrist on the 7-12 that showed multiple erosions in the carpal bones.. He is currently on Humira.  He feels that he is able to do the injections most of the time was approximately 75% of the times.  Occasionally when he misses the wrist gets more tender and swollen.  He has very limited range of motion.  At one point it sounds like arthrodesis of the wrist or injection with corticosteroid were discussed with him.  At that point and even today he feels that that is something he may want to defer for now.  He has noted once again that there are no other areas of pain.  He noted pain to be mild in the left wrist with 4.0/10.  In the morning his stiffness is 2-3 minutes.  He has no history of psoriasis himself or the family of ulcerative colitis Crohn's disease.  There is no family history of rheumatoid arthritis or lupus.  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  He has an active lifestyle, he has 2 young boys.  He takes 5-10 drinks of alcohol variably per week.  He is not a smoker.  He describes himself otherwise in good health.  He has taken Aleve which has provided some relief in the past first 6 6 years or so he was on sulfasalazine which may have provided some relief but not all that impressed.  He has been on Humira for nearly 9 years now.   Further historical information and ADL limitations as noted in the multidimensional health assessment questionnaire attached in the EMR. Rest of the 13 system ROS is negative.     ALLERGIES:Review of patient's allergies indicates no known allergies.    PAST MEDICAL/ACTIVE PROBLEMS/MEDICATION/ FAMILY HISTORY/SOCIAL DATA:  The patient has a family history of  No past medical history on file.  History   Smoking Status     Never Smoker   Smokeless Tobacco     Never Used     There is no problem list on file for  this patient.    Current Outpatient Prescriptions   Medication Sig Dispense Refill     adalimumab (HUMIRA) 40 mg/0.8 mL injection Inject 40 mg under the skin once.       No current facility-administered medications for this visit.        COMPREHENSIVE EXAMINATION:  Vitals:    11/15/17 1514   BP: 136/86   Pulse: 84   Weight: (!) 225 lb (102.1 kg)     A well appearing alert oriented male. Vital data as noted above. His eyes without inflammation/scleromalacia. ENTwithout oral mucositis, thrush, nasal deformity, external ear redness, deformity. His neck is without lymphadenopathy and supple. Lungs normal sounds, no pleural rub. Heart auscultation normal rate, rhythm; no pericardial rub and murmurs. Abdomen soft, non tender, no organomegaly. Skin examined for heliotrope, malar area eruption, lupus pernio, periungual erythema, sclerodactyly, papules, erythema nodosum, purpura, nail pitting, onycholysis, and obvious psoriasis lesion. Neurological examination shows normal alertness, speech, facial symmetry, tone and power in upper and lower extremities, Tinel's and Phalen's at wrist and gait. The joint examination is performed for swelling, tenderness, warmth, erythema, and range of motion in the following joints: DIPs, PIPs, MCPs, wrists, first CMC's, elbows, shoulders, hips, knees, ankles, feet; spine for range of motion and paraspinal muscles for tenderness. The salient normal / abnormal findings are appended.  He has residual synovitis of the left wrist he has severe limitation of range of motion.  In the extensor range of motion no more than about 10-15 .  His flexion is nearly normal.  No other area of synovitis is found in the palpable appendicular joints..    LAB / IMAGING DATA:  ALT   Date Value Ref Range Status   10/10/2016 17 0 - 45 U/L Final   04/12/2016 31 0 - 45 U/L Final   10/14/2015 19 0 - 45 U/L Final     Albumin   Date Value Ref Range Status   10/10/2016 4.2 3.5 - 5.0 g/dL Final   04/12/2016 4.0 3.5 - 5.0  g/dL Final   10/14/2015 4.0 3.5 - 5.0 g/dL Final     Creatinine   Date Value Ref Range Status   10/10/2016 1.07 0.70 - 1.30 mg/dL Final   04/12/2016 0.95 0.70 - 1.30 mg/dL Final   10/14/2015 1.04 0.70 - 1.30 mg/dL Final       WBC   Date Value Ref Range Status   10/10/2016 8.0 4.0 - 11.0 thou/uL Final   04/12/2016 9.1 4.0 - 11.0 thou/uL Final     Hemoglobin   Date Value Ref Range Status   10/10/2016 15.3 14.0 - 18.0 g/dL Final   04/12/2016 15.0 14.0 - 18.0 g/dL Final   10/14/2015 14.6 14.0 - 18.0 g/dL Final     Platelets   Date Value Ref Range Status   10/10/2016 219 140 - 440 thou/uL Final   04/12/2016 221 140 - 440 thou/uL Final   10/14/2015 266 140 - 440 thou/uL Final       Lab Results   Component Value Date    SEDRATE 11 10/10/2016

## 2021-06-16 PROBLEM — M06.00 SERONEGATIVE RHEUMATOID ARTHRITIS (H): Status: ACTIVE | Noted: 2018-10-18

## 2021-06-16 PROBLEM — M19.032 ARTHRITIS OF WRIST, LEFT, DEGENERATIVE: Status: ACTIVE | Noted: 2018-03-06

## 2021-06-16 PROBLEM — Z79.899 HIGH RISK MEDICATION USE: Status: ACTIVE | Noted: 2018-03-06

## 2021-06-16 NOTE — TELEPHONE ENCOUNTER
Telephone Encounter by Ana Jackson at 1/18/2019  9:38 AM     Author: Ana Jackson Service: -- Author Type: --    Filed: 1/18/2019 10:29 AM Encounter Date: 1/16/2019 Status: Addendum    : Ana Jackson    Related Notes: Original Note by Ana Jackson filed at 1/18/2019  9:39 AM       [place] APPROVED:    Approval start date: 01/17/19  Approval end date: 1/17/2021    This was approved for the Orencia Clickjet. Called [facility] [phone] but they do not have a Rx for this patient, just the Humira.    Please send a Rx fort the Orencia ClickJet to [facility] - [place], [place] - [address] [[id number]]  Or provider can give a verbal over the phone [phone].

## 2021-06-16 NOTE — PROGRESS NOTES
Blaine Washington is a 45 y.o. male who is being evaluated via a billable video visit.      How would you like to obtain your AVS? Mail a copy.  If dropped from the video visit, the video invitation should be resent by: Text to cell phone: [phone]  Will anyone else be joining your video visit? No      Video Start Time: 5:10 PM  Video-Visit Details    Type of service:  Video Visit    Video End Time (time video stopped): 5:16 PM  Originating Location (pt. Location): Home    Distant Location (provider location):  [facility]     Platform used for Video Visit: [employer]  \  This document was created using a software with less than 100% fidelity, at times resulting in unintended, even erroneous syntax and grammar.  The reader is advised to keep this under consideration while reviewing, interpreting this note.           ASSESSMENT AND PLAN:    Diagnoses and all orders for this visit:    Seronegative rheumatoid arthritis (H)  -     abatacept (ORENCIA) 125 mg/mL Syrg; Inject 125 mg under the skin every 7 days.  Dispense: 4 mL; Refill: 5    Other secondary osteoarthritis of left wrist    High risk medication use        Follow up in 6 months      HISTORY OF PRESENTING ILLNESS:  Blaine Washington 45 y.o. is evaluated here via video/audio link. This is for follow-up.  He has rheumatoid arthritis.  This is longstanding.  Severe.  Resulted in damage to the left wrist joint with secondary osteoarthritis.    Since he has been on Orencia his joint symptoms have been much better controlled previously was on Humira.  Even the left wrist has not troubled her that much and  MRI in the past have shown severe to moderate the severe erosive damage to the cartilage and carpal bones..  There is no family history of rheumatoid arthritis or lupus.  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  He has an active lifestyle, he has 2 young boys.  He takes 5-10 drinks of alcohol  variably per week.  He is not a smoker.  He used to be on Humira, for several years.  He has recurrent tenderness at briefly as well.  He describes himself otherwise in good health.  .  He is to follow-up here in 6 months with labs in the next couple of days.       ROS enquiry held for fever, ocular symptoms, rash, headache,  GI issues.  Today we also discussed the issues related to the current pandemic, the pros and cons of the current treatment plan, the CDC guidelines such as social distancing washing the hands covering the cough.  ALLERGIES:Patient has no known allergies.    PAST MEDICAL/ACTIVE PROBLEMS/MEDICATION/SOCIAL DATA  No past medical history on file.  Social History     Tobacco Use   Smoking Status Never Smoker   Smokeless Tobacco Never Used     Patient Active Problem List   Diagnosis     Arthritis of wrist, left, degenerative     High risk medication use     Seronegative rheumatoid arthritis (H)     Current Outpatient Medications   Medication Sig Dispense Refill     abatacept (ORENCIA) 125 mg/mL Syrg Inject 125 mg under the skin every 7 days. 4 mL 5     naproxen sodium (ALEVE) 220 MG tablet Take 220 mg by mouth 2 (two) times a day with meals.       No current facility-administered medications for this visit.          EXAMINATION:    Using the audio and video link as best as possible the constitutional, neck, neurologic, psych, skin, both upper extremities areas/organ system were evaluated during this assessment.  Some of the important findings: Alert, oriented, speech fluent.   Able to fully flex the digits, into fists bilaterally, wrist and elbow range of motion appear normal, abduction of the shoulder is normal.      LAB / IMAGING DATA:  ALT   Date Value Ref Range Status   08/24/2020 22 0 - 45 U/L Final   02/26/2020 21 0 - 45 U/L Final   10/08/2019 22 0 - 45 U/L Final     Albumin   Date Value Ref Range Status   08/24/2020 4.1 3.5 - 5.0 g/dL Final   02/26/2020 4.1 3.5 - 5.0 g/dL Final   10/08/2019 4.1  3.5 - 5.0 g/dL Final     Creatinine   Date Value Ref Range Status   08/24/2020 1.13 0.70 - 1.30 mg/dL Final   02/26/2020 1.14 0.70 - 1.30 mg/dL Final   10/08/2019 1.16 0.70 - 1.30 mg/dL Final       WBC   Date Value Ref Range Status   08/24/2020 9.6 4.0 - 11.0 thou/uL Final   02/26/2020 7.1 4.0 - 11.0 thou/uL Final     Hemoglobin   Date Value Ref Range Status   08/24/2020 14.9 14.0 - 18.0 g/dL Final   02/26/2020 15.1 14.0 - 18.0 g/dL Final   10/08/2019 16.8 14.0 - 18.0 g/dL Final     Platelets   Date Value Ref Range Status   08/24/2020 246 140 - 440 thou/uL Final   02/26/2020 229 140 - 440 thou/uL Final   10/08/2019 238 140 - 440 thou/uL Final       Lab Results   Component Value Date    RF <15.0 11/15/2017    SEDRATE 12 11/15/2017

## 2021-06-16 NOTE — PROGRESS NOTES
ASSESSMENT AND PLAN:  Blaine Washington 42 y.o. male is seen here on 03/06/18 for follow-up of rheumatoid arthritis, which is seronegative.  He has been on Humira for some time.  Ran out did find one in his fridge.  A few weeks elapsed in the meanwhile.  He notices pain in the left wrist with activity.  He has degenerative changes.  He has been once weekly scheduled for Humira we discussed the possibility that he could do just as well on once every 2 weeks.  He is going to do that.  Follow-up here in 6 months or sooner.  Still trying to inject 100% of the times from 75% now.  He may turn out to be candidate for additional support such as with methotrexate.  Once all the data are reviewed further course to be charted accordingly we will meet here in the next 2 months.  Diagnoses and all orders for this visit:    Seropositive rheumatoid arthritis of multiple sites  -     adalimumab (HUMIRA) 40 mg/0.8 mL injection; Inject 0.8 mL (40 mg total) under the skin every 14 (fourteen) days.  Dispense: 4.8 mL; Refill: 1    Other secondary osteoarthritis of left wrist    High risk medication use    HISTORY OF PRESENTING ILLNESS:  Blaine Washington, 42 y.o., male is here for follow-up of her negative rheumatoid arthritis.  Predominantly affecting his left hand wrist, sometimes PIPs.  He has noted pain level of 5.0 with activities such as snow shoveling.  Otherwise he noted pain level of [place] in the left wrist and none the other joint areas.  He wrapped ran out of Humira and there is been a lapse of 2 weeks.  I typically would take it once weekly.  In the beginning he was asked to take it every 2 weeks.  His symptoms are not controlled.  He is reporting morning stiffness of his limited to 5 minutes.  There is no fever or weight loss blurry vision eye redness mouth sores or nausea, there is no rash.  .  He had an MRI of the left wrist on the 7-12 that showed multiple erosions in the carpal bones.. He is currently on  Humira.  He feels that he is able to do the injections most of the time was approximately 75% of the times.  Occasionally when he misses the wrist gets more tender and swollen.  He has very limited range of motion.  At one point it sounds like arthrodesis of the wrist or injection with corticosteroid were discussed with him.  At that point and even today he feels that that is something he may want to defer for now.  He has noted once again that there are no other areas of pain.  He noted pain to be mild in the left wrist with 4.0/10.  In the morning his stiffness is 2-3 minutes.  He has no history of psoriasis himself or the family of ulcerative colitis Crohn's disease.  There is no family history of rheumatoid arthritis or lupus.  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  He has an active lifestyle, he has 2 young boys.  He takes 5-10 drinks of alcohol variably per week.  He is not a smoker.  He describes himself otherwise in good health.  He has taken Aleve which has provided some relief in the past first 6 6 years or so he was on sulfasalazine which may have provided some relief but not all that impressed.  He has been on Humira for nearly 9 years now.   Further historical information and ADL limitations as noted in the multidimensional health assessment questionnaire attached in the EMR.   ALLERGIES:Review of patient's allergies indicates no known allergies.    PAST MEDICAL/ACTIVE PROBLEMS/MEDICATION/ FAMILY HISTORY/SOCIAL DATA:  The patient has a family history of  No past medical history on file.  History   Smoking Status     Never Smoker   Smokeless Tobacco     Never Used     Patient Active Problem List   Diagnosis     Seropositive rheumatoid arthritis of multiple sites     Current Outpatient Prescriptions   Medication Sig Dispense Refill     adalimumab (HUMIRA) 40 mg/0.8 mL injection Inject 40 mg under the skin once.       No current facility-administered medications for this  "visit.      DETAILED EXAMINATION  03/06/18  :  Vitals:    03/06/18 1505   BP: 128/80   Pulse: 84   Resp: 8   Weight: 218 lb (98.9 kg)   Height: 5' 10\" (1.778 m)     Alert oriented. Head including the face is examined for malar rash, heliotropes, scarring, lupus pernio. Eyes examined for redness such as in episcleritis/scleritis, periorbital lesions.   Neck/ Face examined for parotid gland swelling, range of motion of neck.  Left upper and lower and right upper and lower extremities examined for tenderness, swelling, warmth of the appendicular joints, range of motion, edema, rash.  Some of the important findings included: Tenderness in the left wrist, his extension at the left wrist is limited to 10  or so flexion is 80 .  Rest of the palpable pedicle joints without synovitis.       LAB / IMAGING DATA:  ALT   Date Value Ref Range Status   12/21/2017 23 0 - 45 U/L Final   11/15/2017 18 0 - 45 U/L Final   10/10/2016 17 0 - 45 U/L Final     Albumin   Date Value Ref Range Status   12/21/2017 4.0 3.5 - 5.0 g/dL Final   11/15/2017 4.1 3.5 - 5.0 g/dL Final   10/10/2016 4.2 3.5 - 5.0 g/dL Final     Creatinine   Date Value Ref Range Status   12/21/2017 0.96 0.70 - 1.30 mg/dL Final   11/15/2017 1.05 0.70 - 1.30 mg/dL Final   10/10/2016 1.07 0.70 - 1.30 mg/dL Final       WBC   Date Value Ref Range Status   11/15/2017 6.8 4.0 - 11.0 thou/uL Final   10/10/2016 8.0 4.0 - 11.0 thou/uL Final     Hemoglobin   Date Value Ref Range Status   11/15/2017 14.9 14.0 - 18.0 g/dL Final   10/10/2016 15.3 14.0 - 18.0 g/dL Final   04/12/2016 15.0 14.0 - 18.0 g/dL Final     Platelets   Date Value Ref Range Status   11/15/2017 212 140 - 440 thou/uL Final   10/10/2016 219 140 - 440 thou/uL Final   04/12/2016 221 140 - 440 thou/uL Final       Lab Results   Component Value Date    RF <15.0 11/15/2017    SEDRATE 12 11/15/2017          "

## 2021-06-16 NOTE — TELEPHONE ENCOUNTER
Telephone Encounter by Jen Hills at 10/16/2019 12:15 PM     Author: Jen Hills Service: -- Author Type: Financial Resource Guide    Filed: 10/16/2019 12:18 PM Encounter Date: 10/16/2019 Status: Signed    : Jen Hills (Financial Resource Guide)       Medication: Orencia 125mg/ml  Qty: 4 pens/syringes for 28 days  Insurance: [employer]  Test Claim: Not appropriate at this location  Pharmacy: Restricted to [facility]  Additional Information: ran test claim for both syringes and pens. PA expires 01/17/2021

## 2021-06-16 NOTE — TELEPHONE ENCOUNTER
Telephone Encounter by Ana Jackson at 1/17/2019 10:01 AM     Author: Ana Jackson Service: -- Author Type: --    Filed: 1/17/2019  1:38 PM Encounter Date: 1/16/2019 Status: Addendum    : Ana Jackson    Related Notes: Original Note by Ana Jackson filed at 1/17/2019 10:02 AM       [facility] PA team  [phone]    PA has been initiated for Cindy Salgado

## 2021-06-16 NOTE — TELEPHONE ENCOUNTER
Pt is coming for lab draw on 3/25/21 but we do not have orders in. Can you please put the lab orders in?

## 2021-06-17 NOTE — TELEPHONE ENCOUNTER
Telephone Encounter by Jen Hills at 1/11/2021 12:56 PM     Author: Jen Hills Service: -- Author Type: Financial Resource Guide    Filed: 1/11/2021  1:00 PM Encounter Date: 1/8/2021 Status: Signed    : Jen Hills (Financial Resource Guide)       Prior Authorization Approval  Medication: Orencia 125mg/ml  Qty: 4 syringes for 28 days  Effective Dates: 01/08/2021 - 01/08/2022  Reference Number: [id number]  Insurance Company: [employer]  Pharmacy: restricted to [facility]   Expected Copay: unknown - not covered at this location  Copay Card Available: enrolled  Foundation Assistance Needed/Available: not needed  Patient Assistance Program Needed: not needed  Patient Notified? Yes, pharmacy to notify  Pharmacy Notified? Yes

## 2021-06-17 NOTE — TELEPHONE ENCOUNTER
Telephone Encounter by Jen Hills at 1/8/2021  9:15 AM     Author: Jen Hills Service: -- Author Type: Financial Resource Guide    Filed: 1/8/2021  9:25 AM Encounter Date: 1/8/2021 Status: Signed    : Jen Hills (Financial Resource Guide)       PA Initiation  Medication: Orencia 125mg/ml   QTY: 4 syringes for 28 days  Insurance Company: [employer]  Pharmacy Filing Rx: [facility]  Filling Pharmacy Phone:   Filling Pharmacy Fax: NA  Start Date: 01/08/2021  Additional Information: renewal faxed in with chart notes

## 2021-06-17 NOTE — TELEPHONE ENCOUNTER
Telephone Encounter by Jen Hills at 9/30/2020  3:13 PM     Author: Jen Hills Service: -- Author Type: Financial Resource Guide    Filed: 9/30/2020  3:20 PM Encounter Date: 9/30/2020 Status: Signed    : Jen Hills (Financial Resource Guide)       Medication: Orencina 125mg/ml  QTY: 4 syringes for 28 days  Insurance: [facility] [employer]  Expected Copay: not covered at this location   [place] Needed/Available? Not needed  Pharmacy: restricted to [facility]   Additional Information: PA expires 1/17/21

## 2021-06-21 NOTE — PROGRESS NOTES
ASSESSMENT AND PLAN:  Blaine Washington 43 y.o. male is seen here on 10/18/18 for follow-up of rheumatoid arthritis, which is seronegative.  He returns after a long interval.  He has been on Humira for a long time.  He has residual discomfort in his left wrist.  He has definite swelling.  This requires further looking into.  He does not have family or personal history of psoriasis.  MRI of the left wrist will be taken.  Recent x-ray as did prior MRI in 2009 had suggested erosions.  We discussed the option of adding further medication to Humira such as methotrexate or changing altogether to a different molecule such as Orencia.  Once these data are available further course to be charted accordingly.  I reminded him to check his labs at least every 6 months.  This time will meet here sooner in the next couple of months.    Diagnoses and all orders for this visit:    Seronegative rheumatoid arthritis (H)  -     MR Wrist Without Contrast Left; Future; Expected date: 10/18/18  -     MR Wrist Without Contrast Left    Other secondary osteoarthritis of left wrist  -     MR Wrist Without Contrast Left; Future; Expected date: 10/18/18  -     MR Wrist Without Contrast Left    High risk medication use  -     Albumin; Standing  -     ALT (SGPT); Standing  -     Creatinine; Standing  -     HM2(CBC w/o Differential); Standing      HISTORY OF PRESENTING ILLNESS:  Blaine Washington, 43 y.o., male is here for follow-up.  He has synovitis of the left wrist.  This is over the years been c correction as as rheumatoid arthritis, seronegative.  There are no associated comorbidities which are relevant is such as psoriasis, ulcerative colitis Crohn's disease himself or the family.  He reports residual discomfort in his left wrist which comes on after he has been using it more than usual suggest mowing the loss.  On his previous visit we had mentioned further intensification of treatment.  He was due to come back in 2 months.  This  was during the conversation 7 months ago.  He noted little or no pain at rest in the left wrist.  No other joint areas similarly affected. He is reporting morning stiffness of his limited to 5 minutes.  There is no fever or weight loss blurry vision eye redness mouth sores or nausea, there is no rash.  .  He had an MRI of the left wrist on the 7-12 that showed multiple erosions in the carpal bones.. He is currently on Humira.  He feels that he is able to do the injections most of the time was approximately 75% of the times.  Occasionally when he misses the wrist gets more tender and swollen.  He has very limited range of motion.  At one point it sounds like arthrodesis of the wrist or injection with corticosteroid were discussed with him.  At that point and even today he feels that that is something he may want to defer for now.  He has noted once again that there are no other areas of pain.  He noted pain to be mild in the left wrist with 4.0/10.  In the morning his stiffness is 2-3 minutes.  He has no history of psoriasis himself or the family of ulcerative colitis Crohn's disease.  There is no family history of rheumatoid arthritis or lupus.  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  He has an active lifestyle, he has 2 young boys.  He takes 5-10 drinks of alcohol variably per week.  He is not a smoker.  He describes himself otherwise in good health.  He has taken Aleve which has provided some relief in the past first 6 6 years or so he was on sulfasalazine which may have provided some relief but not all that impressed.  He has been on Humira for nearly 9 years now.   Further historical information and ADL limitations as noted in the multidimensional health assessment questionnaire attached in the EMR.   ALLERGIES:Review of patient's allergies indicates no known allergies.    PAST MEDICAL/ACTIVE PROBLEMS/MEDICATION/ FAMILY HISTORY/SOCIAL DATA:  The patient has a family history  of  No past medical history on file.  History   Smoking Status     Never Smoker   Smokeless Tobacco     Never Used     Patient Active Problem List   Diagnosis     Seropositive rheumatoid arthritis of multiple sites (H)     Arthritis of wrist, left, degenerative     High risk medication use     Current Outpatient Prescriptions   Medication Sig Dispense Refill     adalimumab (HUMIRA PEN) 40 mg/0.8 mL PnKt Inject 40 mg under the skin every 14 (fourteen) days. 1 kit 1     adalimumab (HUMIRA) 40 mg/0.8 mL injection Inject 0.8 mL (40 mg total) under the skin every 14 (fourteen) days. 4.8 mL 1     No current facility-administered medications for this visit.      DETAILED EXAMINATION  10/18/18  :  Vitals:    10/18/18 1633   BP: (!) 138/104   Patient Site: Right Arm   Patient Position: Sitting   Cuff Size: Adult Large   Pulse: 80   Weight: (!) 232 lb (105.2 kg)     Alert oriented. Head including the face is examined for malar rash, heliotropes, scarring, lupus pernio. Eyes examined for redness such as in episcleritis/scleritis, periorbital lesions.   Neck/ Face examined for parotid gland swelling, range of motion of neck.  Left upper and lower and right upper and lower extremities examined for tenderness, swelling, warmth of the appendicular joints, range of motion, edema, rash.  Some of the important findings included: He has tenderness and subtle swelling especially in the ulnar aspect of the left wrist.  No other areas of synovitis in the palpable joints of the upper extremities.  Mild JLT of the right knee, which is without effusion or warmth.    LAB / IMAGING DATA:  ALT   Date Value Ref Range Status   12/21/2017 23 0 - 45 U/L Final   11/15/2017 18 0 - 45 U/L Final   10/10/2016 17 0 - 45 U/L Final     Albumin   Date Value Ref Range Status   12/21/2017 4.0 3.5 - 5.0 g/dL Final   11/15/2017 4.1 3.5 - 5.0 g/dL Final   10/10/2016 4.2 3.5 - 5.0 g/dL Final     Creatinine   Date Value Ref Range Status   12/21/2017 0.96 0.70 -  1.30 mg/dL Final   11/15/2017 1.05 0.70 - 1.30 mg/dL Final   10/10/2016 1.07 0.70 - 1.30 mg/dL Final       WBC   Date Value Ref Range Status   11/15/2017 6.8 4.0 - 11.0 thou/uL Final   10/10/2016 8.0 4.0 - 11.0 thou/uL Final     Hemoglobin   Date Value Ref Range Status   11/15/2017 14.9 14.0 - 18.0 g/dL Final   10/10/2016 15.3 14.0 - 18.0 g/dL Final   04/12/2016 15.0 14.0 - 18.0 g/dL Final     Platelets   Date Value Ref Range Status   11/15/2017 212 140 - 440 thou/uL Final   10/10/2016 219 140 - 440 thou/uL Final   04/12/2016 221 140 - 440 thou/uL Final       Lab Results   Component Value Date    RF <15.0 11/15/2017    SEDRATE 12 11/15/2017

## 2021-06-22 NOTE — PROGRESS NOTES
ASSESSMENT AND PLAN:  Blaine Washington 43 y.o. male is seen here on 12/19/18 for follow-up of rheumatoid arthritis, which is seronegative, on Humira, continues to have pain and swelling of the left wrist, recent MRI shows extensive damage and signal suggestive of synovitis.  We discussed various options.  2012 MRI had also shown erosions, done elsewhere.  Of the various options we decided in the following plan.  He will take prednisone 15 mg daily.  We will meet here in 3-4 weeks.  Major side effects of steroids including ocular metabolic bone-related were reviewed.  He is aware that the part of his symptoms are from the damage sustained over the years and possibly part of the wrist pain and swelling may be due to inflammatory component.        Diagnoses and all orders for this visit:    Seropositive rheumatoid arthritis of multiple sites (H)  -     adalimumab (HUMIRA) 40 mg/0.8 mL injection  Dispense: 4.8 mL; Refill: 1  -     predniSONE (DELTASONE) 10 mg tablet  Dispense: 45 tablet; Refill: 0    Other secondary osteoarthritis of left wrist    High risk medication use    Seronegative rheumatoid arthritis (H)      HISTORY OF PRESENTING ILLNESS:  Blaine Washington, 43 y.o., male is here for follow-up.  He has ongoing pain and swelling of the left wrist.  This gets worse after shoveling, mowing.  The more he uses the wrist the worse it gets.  In the morning he has significant pain and stiffness.  For further definition of the pathology MRI was done which shows extensive damage to multiple joints and bones around the wrist.  It also suggests that he may have ongoing, active synovitis.  These findings are reviewed with him.  Today he noted it was a better day.  Other days he has been more more bothered by the pain.    This is over the years been \ correction as as rheumatoid arthritis, seronegative.  There are no associated comorbidities which are relevant is such as psoriasis, ulcerative colitis Crohn's disease  himself or the family.  He reports residual discomfort in his left wrist which comes on after he has been using it more than usual suggest mowing the loss.  On his previous visit we had mentioned further intensification of treatment.  He was due to come back in 2 months.  This was during the conversation 7 months ago.  He noted little or no pain at rest in the left wrist.  No other joint areas similarly affected. He is reporting morning stiffness of his limited to 5 minutes.  There is no fever or weight loss blurry vision eye redness mouth sores or nausea, there is no rash.  .  He had an MRI of the left wrist on the 7-12 that showed multiple erosions in the carpal bones.. He is currently on Humira.  He feels that he is able to do the injections most of the time was approximately 75% of the times.  Occasionally when he misses the wrist gets more tender and swollen.  He has very limited range of motion.  At one point it sounds like arthrodesis of the wrist or injection with corticosteroid were discussed with him.  At that point and even today he feels that that is something he may want to defer for now.  He has noted once again that there are no other areas of pain.  He noted pain to be mild in the left wrist with 4.0/10.  In the morning his stiffness is 2-3 minutes.  He has no history of psoriasis himself or the family of ulcerative colitis Crohn's disease.  There is no family history of rheumatoid arthritis or lupus.  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  He has an active lifestyle, he has 2 young boys.  He takes 5-10 drinks of alcohol variably per week.  He is not a smoker.  He describes himself otherwise in good health.  He has taken Aleve which has provided some relief in the past first 6 6 years or so he was on sulfasalazine which may have provided some relief but not all that impressed.  He has been on Humira for nearly 9 years now.   Further historical information and ADL  limitations as noted in the multidimensional health assessment questionnaire attached in the EMR.   ALLERGIES:Patient has no known allergies.    PAST MEDICAL/ACTIVE PROBLEMS/MEDICATION/ FAMILY HISTORY/SOCIAL DATA:  The patient has a family history of  No past medical history on file.  Social History     Tobacco Use   Smoking Status Never Smoker   Smokeless Tobacco Never Used     Patient Active Problem List   Diagnosis     Arthritis of wrist, left, degenerative     High risk medication use     Seronegative rheumatoid arthritis (H)     Current Outpatient Medications   Medication Sig Dispense Refill     adalimumab (HUMIRA PEN) 40 mg/0.8 mL PnKt Inject 40 mg under the skin every 14 (fourteen) days. 1 kit 1     adalimumab (HUMIRA) 40 mg/0.8 mL injection Inject 0.8 mL (40 mg total) under the skin every 14 (fourteen) days. 4.8 mL 1     No current facility-administered medications for this visit.      DETAILED EXAMINATION  12/19/18  :  Vitals:    12/19/18 1552   BP: 110/80   Patient Site: Right Arm   Patient Position: Sitting   Cuff Size: Adult Large   Pulse: 90   Weight: (!) 235 lb (106.6 kg)     Alert oriented. Head including the face is examined for malar rash, heliotropes, scarring, lupus pernio. Eyes examined for redness such as in episcleritis/scleritis, periorbital lesions.   Neck/ Face examined for parotid gland swelling, range of motion of neck.  Left upper and lower and right upper and lower extremities examined for tenderness, swelling, warmth of the appendicular joints, range of motion, edema, rash.  Some of the important findings included: He has swelling and tenderness of the left wrist.  No other palpable joints of upper extremities similarly affected, knees without effusion warmth or JLT.     LAB / IMAGING DATA:  ALT   Date Value Ref Range Status   11/08/2018 28 0 - 45 U/L Final   12/21/2017 23 0 - 45 U/L Final   11/15/2017 18 0 - 45 U/L Final     Albumin   Date Value Ref Range Status   11/08/2018 3.9 3.5 -  5.0 g/dL Final   12/21/2017 4.0 3.5 - 5.0 g/dL Final   11/15/2017 4.1 3.5 - 5.0 g/dL Final     Creatinine   Date Value Ref Range Status   11/08/2018 1.02 0.70 - 1.30 mg/dL Final   12/21/2017 0.96 0.70 - 1.30 mg/dL Final   11/15/2017 1.05 0.70 - 1.30 mg/dL Final       WBC   Date Value Ref Range Status   11/08/2018 8.3 4.0 - 11.0 thou/uL Final   11/15/2017 6.8 4.0 - 11.0 thou/uL Final     Hemoglobin   Date Value Ref Range Status   11/08/2018 14.7 14.0 - 18.0 g/dL Final   11/15/2017 14.9 14.0 - 18.0 g/dL Final   10/10/2016 15.3 14.0 - 18.0 g/dL Final     Platelets   Date Value Ref Range Status   11/08/2018 234 140 - 440 thou/uL Final   11/15/2017 212 140 - 440 thou/uL Final   10/10/2016 219 140 - 440 thou/uL Final       Lab Results   Component Value Date    RF <15.0 11/15/2017    SEDRATE 12 11/15/2017

## 2021-06-23 NOTE — TELEPHONE ENCOUNTER
Dr Paniagua is okay with pt having Orencia Clickjet or syringe, whichever the insurance will cover is fine. Thanks!

## 2021-06-23 NOTE — PROGRESS NOTES
ASSESSMENT AND PLAN:  Blaine Washington 43 y.o. male is seen here on 01/15/19 for follow-up of rheumatoid arthritis, which is seronegative, on Humira with significant synovitis clinically and imaging wise on the left wrist and seen her most recently.  He returns with excellent improvement in his wrist pain with the prednisone 15 mg which have been within couple of days.  He is going to discontinue Humira.  Suggested Orencia unless insurance stipulates otherwise.  He can take nonsteroidals in the interim            Diagnoses and all orders for this visit:    Seronegative rheumatoid arthritis (H)    Other secondary osteoarthritis of left wrist    High risk medication use    There are no diagnoses linked to this encounter.  HISTORY OF PRESENTING ILLNESS:  Blaine Washington, 43 y.o., male is here for follow-up.  On his previous visit we had active active synovitis of the left wrist clinically and imaging wise with extensive damage.  Prednisone 15 mg helps his symptoms quite significantly that began to show itself within 48 hours, the pain is now virtually nonexistent at 0.5/10, able to do all day-to-day activities without difficulty, stiffness in the morning no more than 5 minutes at rest.  Previously he had noted he has ongoing pain and swelling of the left wrist.  This gets worse after shoveling, mowing.  The more he uses the wrist the worse it gets.  In the morning he has significant pain and stiffness.  For further definition of the pathology MRI was done which shows extensive damage to multiple joints and bones around the wrist.  It also suggests that he may have ongoing, active synovitis.  These findings are reviewed with him.  Today he noted it was a better day.  Other days he has been more more bothered by the pain.    This is over the years been \ correction as as rheumatoid arthritis, seronegative.  There are no associated comorbidities which are relevant is such as psoriasis, ulcerative colitis  Crohn's disease himself or the family.  He reports residual discomfort in his left wrist which comes on after he has been using it more than usual suggest mowing the loss.  On his previous visit we had mentioned further intensification of treatment.  He was due to come back in 2 months.  This was during the conversation 7 months ago.  He noted little or no pain at rest in the left wrist.  No other joint areas similarly affected. He is reporting morning stiffness of his limited to 5 minutes.  There is no fever or weight loss blurry vision eye redness mouth sores or nausea, there is no rash.  .  He had an MRI of the left wrist on the 7-12 that showed multiple erosions in the carpal bones.. He is currently on Humira.  He feels that he is able to do the injections most of the time was approximately 75% of the times.  Occasionally when he misses the wrist gets more tender and swollen.  He has very limited range of motion.  At one point it sounds like arthrodesis of the wrist or injection with corticosteroid were discussed with him.  At that point and even today he feels that that is something he may want to defer for now.  He has noted once again that there are no other areas of pain.  He noted pain to be mild in the left wrist with 4.0/10.  In the morning his stiffness is 2-3 minutes.  He has no history of psoriasis himself or the family of ulcerative colitis Crohn's disease.  There is no family history of rheumatoid arthritis or lupus.  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  He has an active lifestyle, he has 2 young boys.  He takes 5-10 drinks of alcohol variably per week.  He is not a smoker.  He describes himself otherwise in good health.  He has taken Aleve which has provided some relief in the past first 6 6 years or so he was on sulfasalazine which may have provided some relief but not all that impressed.  He has been on Humira for nearly 9 years now.   Further historical  information and ADL limitations as noted in the multidimensional health assessment questionnaire attached in the EMR.   ALLERGIES:Patient has no known allergies.    PAST MEDICAL/ACTIVE PROBLEMS/MEDICATION/ FAMILY HISTORY/SOCIAL DATA:  The patient has a family history of  No past medical history on file.  Social History     Tobacco Use   Smoking Status Never Smoker   Smokeless Tobacco Never Used     Patient Active Problem List   Diagnosis     Arthritis of wrist, left, degenerative     High risk medication use     Seronegative rheumatoid arthritis (H)     Current Outpatient Medications   Medication Sig Dispense Refill     adalimumab (HUMIRA PEN) 40 mg/0.8 mL PnKt Inject 40 mg under the skin every 14 (fourteen) days. 1 kit 1     adalimumab (HUMIRA) 40 mg/0.8 mL injection Inject 0.8 mL (40 mg total) under the skin every 14 (fourteen) days. 4.8 mL 1     predniSONE (DELTASONE) 10 mg tablet Take 15 mg by mouth daily. 45 tablet 0     No current facility-administered medications for this visit.      DETAILED EXAMINATION  01/15/19  :  Vitals:    01/15/19 1633 01/15/19 1651   BP: (!) 144/92 134/90   Patient Site: Right Arm Right Arm   Patient Position: Sitting Sitting   Cuff Size: Adult Large Adult Large   Pulse: 80    Weight: (!) 235 lb (106.6 kg)      Alert oriented. Head including the face is examined for malar rash, heliotropes, scarring, lupus pernio. Eyes examined for redness such as in episcleritis/scleritis, periorbital lesions.   Neck/ Face examined for parotid gland swelling, range of motion of neck.  Left upper and lower and right upper and lower extremities examined for tenderness, swelling, warmth of the appendicular joints, range of motion, edema, rash.  Some of the important findings included: The wrists are without tenderness swelling or warmth. No other palpable joints of upper extremities similarly affected, knees without effusion warmth or JLT.     LAB / IMAGING DATA:  ALT   Date Value Ref Range Status    11/08/2018 28 0 - 45 U/L Final   12/21/2017 23 0 - 45 U/L Final   11/15/2017 18 0 - 45 U/L Final     Albumin   Date Value Ref Range Status   11/08/2018 3.9 3.5 - 5.0 g/dL Final   12/21/2017 4.0 3.5 - 5.0 g/dL Final   11/15/2017 4.1 3.5 - 5.0 g/dL Final     Creatinine   Date Value Ref Range Status   11/08/2018 1.02 0.70 - 1.30 mg/dL Final   12/21/2017 0.96 0.70 - 1.30 mg/dL Final   11/15/2017 1.05 0.70 - 1.30 mg/dL Final       WBC   Date Value Ref Range Status   11/08/2018 8.3 4.0 - 11.0 thou/uL Final   11/15/2017 6.8 4.0 - 11.0 thou/uL Final     Hemoglobin   Date Value Ref Range Status   11/08/2018 14.7 14.0 - 18.0 g/dL Final   11/15/2017 14.9 14.0 - 18.0 g/dL Final   10/10/2016 15.3 14.0 - 18.0 g/dL Final     Platelets   Date Value Ref Range Status   11/08/2018 234 140 - 440 thou/uL Final   11/15/2017 212 140 - 440 thou/uL Final   10/10/2016 219 140 - 440 thou/uL Final       Lab Results   Component Value Date    RF <15.0 11/15/2017    SEDRATE 12 11/15/2017

## 2021-06-23 NOTE — TELEPHONE ENCOUNTER
Prior Authorization Request  Who s requesting:  Dr. Paniagua/ Rheumatologist   Pharmacy Name and Location: [facility] - [place], [place] - [address] [facility] [address]  Medication Name: ORENCIA injections   Si mg subcutabeous q. 7 days. Refills :5  Insurance Plan: [email or web address]  Insurance Member ID Number:  [id number]  Informed patient that prior authorizations can take up to 10 business days for response:   Yes  Okay to leave a detailed message: Yes    Pt is now switching to Orencia injections. Please start PA for pt and keep us posted. Thanks  Jannet Mattson CMA [facility] 2019 9:04 AM

## 2021-06-23 NOTE — TELEPHONE ENCOUNTER
Please advise:    It looks like patient's insurance prefers the Orencia Clickjet form of this medication.  I believe the insurance will still need a PA but this should be covered. The Orencia syringes are not formulary.     will provider be ok with this form of medication?

## 2021-06-23 NOTE — TELEPHONE ENCOUNTER
Left message for pt that this was approved and sent to [facility] pharmacy. Pt to call back with any questions.

## 2021-07-03 NOTE — ADDENDUM NOTE
Addendum Note by Dinora Monte RN at 1/15/2019  4:40 PM     Author: Dinora Monte RN Service: -- Author Type: Registered Nurse    Filed: 1/16/2019  9:14 AM Encounter Date: 1/15/2019 Status: Signed    : Dinora Monte RN (Registered Nurse)    Addended by: DINORA MONTE on: 1/16/2019 09:14 AM        Modules accepted: Orders

## 2021-08-03 PROBLEM — M05.79 SEROPOSITIVE RHEUMATOID ARTHRITIS OF MULTIPLE SITES (H): Status: RESOLVED | Noted: 2017-11-15 | Resolved: 2018-10-18

## 2022-02-09 DIAGNOSIS — M06.00 SERONEGATIVE RHEUMATOID ARTHRITIS (H): ICD-10-CM

## 2022-02-09 NOTE — TELEPHONE ENCOUNTER
Refill request received from [facility] specialty for orencia.     Last OV 4/13/2021  Last lab 4/19/2021  Future appt: n/a    Pt is due for a follow up. Registrar please call pt to schedule appt, thanks.       Rx sent for review.

## 2022-02-14 ENCOUNTER — VIRTUAL VISIT (OUTPATIENT)
Dept: RHEUMATOLOGY | Facility: CLINIC | Age: 47
End: 2022-02-14
Payer: COMMERCIAL

## 2022-02-14 DIAGNOSIS — M19.232 OTHER SECONDARY OSTEOARTHRITIS OF LEFT WRIST: ICD-10-CM

## 2022-02-14 DIAGNOSIS — M06.00 SERONEGATIVE RHEUMATOID ARTHRITIS (H): Primary | ICD-10-CM

## 2022-02-14 DIAGNOSIS — Z79.899 HIGH RISK MEDICATION USE: ICD-10-CM

## 2022-02-14 PROCEDURE — 99214 OFFICE O/P EST MOD 30 MIN: CPT | Mod: GT | Performed by: INTERNAL MEDICINE

## 2022-02-14 RX ORDER — ABATACEPT 125 MG/ML
125 INJECTION, SOLUTION SUBCUTANEOUS
Qty: 4 ML | Refills: 5 | Status: SHIPPED | OUTPATIENT
Start: 2022-02-14 | End: 2022-12-26

## 2022-02-14 NOTE — PROGRESS NOTES
Blaine is a 46 year old who is being evaluated via a billable video visit.      How would you like to obtain your AVS? MyChart  If the video visit is dropped, the invitation should be resent by: Text to cell phone: [phone]  Will anyone else be joining your video visit? No      Video Start Time: 2:51pm  Video-Visit Details    Type of service:  Video Visit    Video End Time:3:03 PM    Originating Location (pt. Location): Home    Distant Location (provider location):  [facility]     Platform used for Video Visit: [employer]    This document was created using a software with less than 100% fidelity, at times resulting in unintended, even erroneous syntax and grammar.  The reader is advised to keep this under consideration while reviewing, interpreting this note.           ASSESSMENT AND PLAN:    Diagnoses and all orders for this visit:  Seronegative rheumatoid arthritis (H)  -     Albumin level; Standing  -     ALT; Standing  -     CBC with platelets; Standing  -     Creatinine; Standing  -     Abatacept (ORENCIA) 125 MG/ML SOSY pre-filled syringe; Inject 1 mL (125 mg) Subcutaneous every 7 days  High risk medication use  -     Albumin level; Standing  -     ALT; Standing  -     CBC with platelets; Standing  -     Creatinine; Standing  Other secondary osteoarthritis of left wrist      Follow up in 6 months      HISTORY OF PRESENTING ILLNESS:  Blaine Washington 46 year old is evaluated here via video/audio link.   This is for follow-up.  He has rheumatoid arthritis.  This is longstanding.  Severe.  Resulted in damage to the left wrist joint with secondary osteoarthritis.    Since he has been on Orencia his joint symptoms have been much better controlled compared to previously when he was on Humira.  Even the left wrist has not troubled her that much and  MRI in the past have shown severe to moderate the severe erosive damage to the cartilage and carpal bones..  There is no family  history of rheumatoid arthritis or lupus.  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  He has an active lifestyle, he has 2 young boys, most of the time they are at school in person learning, he and his wife work from home..  He takes 5-10 drinks of alcohol variably per week.  He is not a smoker.    He describes himself otherwise in good health.  .  He is to follow-up here in 6 months with labs in the next couple of days.       ROS enquiry held for fever, ocular symptoms, rash, headache,  GI issues.  Today we also discussed the issues related to the current pandemic, the pros and cons of the current treatment plan, the CDC guidelines such as social distancing washing the hands covering the cough.  ALLERGIES:Patient has no known allergies.    PAST MEDICAL/ACTIVE PROBLEMS/MEDICATION/SOCIAL DATA  No past medical history on file.  History   Smoking Status     Never Smoker   Smokeless Tobacco     Never Used     Patient Active Problem List   Diagnosis     Arthritis of wrist, left, degenerative     High risk medication use     Seronegative rheumatoid arthritis (H)     Current Outpatient Medications   Medication Sig Dispense Refill     abatacept (ORENCIA) 125 mg/mL Syrg [ABATACEPT (ORENCIA) 125 MG/ML SYRG] Inject 125 mg under the skin every 7 days. 4 mL 5     naproxen sodium (ALEVE) 220 MG tablet [NAPROXEN SODIUM (ALEVE) 220 MG TABLET] Take 220 mg by mouth 2 (two) times a day with meals.           EXAMINATION:    Using the audio and video link as best as possible the constitutional, neck, neurologic, psych, skin, both upper extremities areas/organ system were evaluated during this assessment.  Some of the important findings: Alert, oriented, speech fluent.    Able to fully flex the digits, into fists bilaterally, wrist and elbow range of motion appear normal, abduction of the shoulder is normal.      LAB / IMAGING DATA:  ALT   Date Value Ref Range Status   04/19/2021 19 0 - 45 U/L Final    08/24/2020 22 0 - 45 U/L Final   02/26/2020 21 0 - 45 U/L Final     Albumin   Date Value Ref Range Status   04/19/2021 4.1 3.5 - 5.0 g/dL Final   08/24/2020 4.1 3.5 - 5.0 g/dL Final   02/26/2020 4.1 3.5 - 5.0 g/dL Final       WBC   Date Value Ref Range Status   04/19/2021 8.6 4.0 - 11.0 thou/uL Final   08/24/2020 9.6 4.0 - 11.0 thou/uL Final     Hemoglobin   Date Value Ref Range Status   04/19/2021 14.5 14.0 - 18.0 g/dL Final   08/24/2020 14.9 14.0 - 18.0 g/dL Final   02/26/2020 15.1 14.0 - 18.0 g/dL Final     Platelet Count   Date Value Ref Range Status   04/19/2021 253 140 - 440 thou/uL Final   08/24/2020 246 140 - 440 thou/uL Final   02/26/2020 229 140 - 440 thou/uL Final       No results found for: NERI

## 2022-02-15 RX ORDER — ABATACEPT 125 MG/ML
125 INJECTION, SOLUTION SUBCUTANEOUS
Qty: 4 ML | Refills: 5 | OUTPATIENT
Start: 2022-02-15

## 2022-08-26 ENCOUNTER — TELEPHONE (OUTPATIENT)
Dept: RHEUMATOLOGY | Facility: CLINIC | Age: 47
End: 2022-08-26

## 2022-08-26 NOTE — TELEPHONE ENCOUNTER
[facility] Call Center    Phone Message    May a detailed message be left on voicemail: yes     Reason for Call: Medication Refill Request    Has the patient contacted the pharmacy for the refill? Yes   Name of medication being requested: Orencia   Provider who prescribed the medication: Dr Paniagua  Pharmacy: [facility] [address]  Date medication is needed: 8/30     Action Taken: Message routed to:  Other: Rheumatology Support Pool     Travel Screening: Not Applicable

## 2022-08-30 NOTE — TELEPHONE ENCOUNTER
Call received from [facility] stating they still have one refill on file for pt, and they have been trying to contact him to arrange delivery, but have been unable to reach him. They are requesting if we speak to him, that we ask him to also reach out to their pharmacy to get his delivery set up.     So if pt calls back, please discuss with pt:  1.) He needs to schedule lab appt now and follow-up with Dr. Paniagua.   2.) Needs to contact [facility] to arrange delivery of his Orencia.     Message left in pt's cell to return call.

## 2022-09-07 NOTE — TELEPHONE ENCOUNTER
Pt returning call as he just got back from being out of town, message below was relayed. Pt is going to call [facility] pharmacy to set up his delivery. Pt is going to go to [facility] to do the walk in @ the lab in the next few days and he was also scheduled for next available video visit.

## 2022-09-15 ENCOUNTER — LAB (OUTPATIENT)
Dept: LAB | Facility: CLINIC | Age: 47
End: 2022-09-15
Payer: COMMERCIAL

## 2022-09-15 DIAGNOSIS — M06.00 SERONEGATIVE RHEUMATOID ARTHRITIS (H): ICD-10-CM

## 2022-09-15 DIAGNOSIS — Z79.899 HIGH RISK MEDICATION USE: ICD-10-CM

## 2022-09-15 LAB
ALBUMIN SERPL-MCNC: 3.5 G/DL (ref 3.5–5)
ALT SERPL W P-5'-P-CCNC: 20 U/L (ref 0–45)
CREAT SERPL-MCNC: 1.21 MG/DL (ref 0.7–1.3)
ERYTHROCYTE [DISTWIDTH] IN BLOOD BY AUTOMATED COUNT: 13.2 % (ref 10–15)
GFR SERPL CREATININE-BSD FRML MDRD: 74 ML/MIN/1.73M2
HCT VFR BLD AUTO: 42.1 % (ref 40–53)
HGB BLD-MCNC: 14.6 G/DL (ref 13.3–17.7)
MCH RBC QN AUTO: 29.9 PG (ref 26.5–33)
MCHC RBC AUTO-ENTMCNC: 34.7 G/DL (ref 31.5–36.5)
MCV RBC AUTO: 86 FL (ref 78–100)
PLATELET # BLD AUTO: 244 10E3/UL (ref 150–450)
RBC # BLD AUTO: 4.89 10E6/UL (ref 4.4–5.9)
WBC # BLD AUTO: 7.7 10E3/UL (ref 4–11)

## 2022-09-15 PROCEDURE — 82040 ASSAY OF SERUM ALBUMIN: CPT

## 2022-09-15 PROCEDURE — 85027 COMPLETE CBC AUTOMATED: CPT

## 2022-09-15 PROCEDURE — 84460 ALANINE AMINO (ALT) (SGPT): CPT

## 2022-09-15 PROCEDURE — 36415 COLL VENOUS BLD VENIPUNCTURE: CPT

## 2022-09-15 PROCEDURE — 82565 ASSAY OF CREATININE: CPT

## 2022-09-15 NOTE — TELEPHONE ENCOUNTER
Pt has not completed labs yet, cannot refill Orencia until pt has labs drawn per Dr Paniagua, last labs on file- 4/19/21

## 2022-12-26 ENCOUNTER — VIRTUAL VISIT (OUTPATIENT)
Dept: RHEUMATOLOGY | Facility: CLINIC | Age: 47
End: 2022-12-26
Payer: COMMERCIAL

## 2022-12-26 DIAGNOSIS — Z79.899 HIGH RISK MEDICATION USE: ICD-10-CM

## 2022-12-26 DIAGNOSIS — M06.00 SERONEGATIVE RHEUMATOID ARTHRITIS (H): Primary | ICD-10-CM

## 2022-12-26 DIAGNOSIS — M19.232 OTHER SECONDARY OSTEOARTHRITIS OF LEFT WRIST: ICD-10-CM

## 2022-12-26 PROCEDURE — 99214 OFFICE O/P EST MOD 30 MIN: CPT | Mod: 95 | Performed by: INTERNAL MEDICINE

## 2022-12-26 RX ORDER — ABATACEPT 125 MG/ML
125 INJECTION, SOLUTION SUBCUTANEOUS
Qty: 4 ML | Refills: 5 | Status: SHIPPED | OUTPATIENT
Start: 2022-12-26

## 2022-12-26 NOTE — PROGRESS NOTES
Blaine is a 47 year old who is being evaluated via a billable video visit.      How would you like to obtain your AVS? MyChart  If the video visit is dropped, the invitation should be resent by: Text to cell phone: [phone]  Will anyone else be joining your video visit? No        Video-Visit Details    Type of service:  Video Visit     Video start: 1:21 PM  Video end: 127 p.m.    Originating Location (pt. Location): Home    Distant Location (provider location):  On-site  Platform used for Video Visit: Sponge    This document was created using a software with less than 100% fidelity, at times resulting in unintended, even erroneous syntax and grammar.  The reader is advised to keep this under consideration while reviewing, interpreting this note.           ASSESSMENT AND PLAN:    Diagnoses and all orders for this visit:  Seronegative rheumatoid arthritis (H)  -     Abatacept (ORENCIA) 125 MG/ML SOSY pre-filled syringe; Inject 1 mL (125 mg) Subcutaneous every 7 days  High risk medication use  Other secondary osteoarthritis of left wrist      Follow up in 6 months      HISTORY OF PRESENTING ILLNESS:  Blaine Washington 47 year old is evaluated here via video/audio link.    This is for follow-up.  He has rheumatoid arthritis.  This is longstanding.  Severe.  Resulted in damage to the left wrist joint with secondary osteoarthritis.    He feels Orencia has been more helpful for his joint symptoms and was a catch with Humira.  Recent snow cleaning for example did not lead to worsening of joint symptoms as was the case in the past.  He is able to wake up in the morning with minimal if any stiffness no swelling has been noted.  The left wrist  MRI in the past has shown severe to moderate the severe erosive damage to the cartilage and carpal bones..  There is no family history of rheumatoid arthritis or lupus.  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  He has an active lifestyle,  he has 2 young boys, most of the time they are at school in person learning, he and his wife work from home..  He takes 5-10 drinks of alcohol variably per week.  He is not a smoker.    He describes himself otherwise in good health.  .  He is to follow-up here in 6 months with labs in the next couple of days.         ROS enquiry held for fever, ocular symptoms, rash, headache,  GI issues.  Today we also discussed the issues related to the current pandemic, the pros and cons of the current treatment plan, the CDC guidelines such as social distancing washing the hands covering the cough.  ALLERGIES:Patient has no known allergies.    PAST MEDICAL/ACTIVE PROBLEMS/MEDICATION/SOCIAL DATA  No past medical history on file.  History   Smoking Status     Never   Smokeless Tobacco     Never     Patient Active Problem List   Diagnosis     Arthritis of wrist, left, degenerative     High risk medication use     Seronegative rheumatoid arthritis (H)     Current Outpatient Medications   Medication Sig Dispense Refill     Abatacept (ORENCIA) 125 MG/ML SOSY pre-filled syringe Inject 1 mL (125 mg) Subcutaneous every 7 days 4 mL 5     naproxen sodium (ALEVE) 220 MG tablet [NAPROXEN SODIUM (ALEVE) 220 MG TABLET] Take 220 mg by mouth 2 (two) times a day with meals.           EXAMINATION:    Using the audio and video link as best as possible the constitutional, neck, neurologic, psych, skin, both upper extremities areas/organ system were evaluated during this assessment.  Some of the important findings: Alert, oriented, speech fluent.    Able to fully flex the digits, into fists bilaterally, wrist and elbow range of motion appear normal, abduction of the shoulder is normal.      LAB / IMAGING DATA:  ALT   Date Value Ref Range Status   09/15/2022 20 0 - 45 U/L Final   04/19/2021 19 0 - 45 U/L Final   08/24/2020 22 0 - 45 U/L Final     Albumin   Date Value Ref Range Status   09/15/2022 3.5 3.5 - 5.0 g/dL Final   04/19/2021 4.1 3.5 - 5.0  g/dL Final   08/24/2020 4.1 3.5 - 5.0 g/dL Final       WBC   Date Value Ref Range Status   04/19/2021 8.6 4.0 - 11.0 thou/uL Final   08/24/2020 9.6 4.0 - 11.0 thou/uL Final     WBC Count   Date Value Ref Range Status   09/15/2022 7.7 4.0 - 11.0 10e3/uL Final     Hemoglobin   Date Value Ref Range Status   09/15/2022 14.6 13.3 - 17.7 g/dL Final   04/19/2021 14.5 14.0 - 18.0 g/dL Final   08/24/2020 14.9 14.0 - 18.0 g/dL Final     Platelet Count   Date Value Ref Range Status   09/15/2022 244 150 - 450 10e3/uL Final   04/19/2021 253 140 - 440 thou/uL Final   08/24/2020 246 140 - 440 thou/uL Final       No results found for: NERI

## 2023-03-03 ENCOUNTER — TELEPHONE (OUTPATIENT)
Dept: RHEUMATOLOGY | Facility: CLINIC | Age: 48
End: 2023-03-03
Payer: COMMERCIAL

## 2023-03-03 NOTE — TELEPHONE ENCOUNTER
PA Initiation    Medication: Orencia pa renewal  Insurance Company: [employer] - Phone [phone] Fax [phone]  Pharmacy Filling the Rx:    Filling Pharmacy Phone:    Filling Pharmacy Fax:    Start Date: 3/3/2023      HAILEE Avery, [facility]  Special[facility]    [email or web address]     Phone: [phone]  Fax: [phone]

## 2023-03-06 NOTE — TELEPHONE ENCOUNTER
Prior Authorization Approval    Authorization Effective Date: 3/3/2023  Authorization Expiration Date: 3/3/2024  Medication: Orencia pa renewal  Approved Dose/Quantity:   Reference #: [id number]   Insurance Company: [employer] - Phone [phone] Fax [phone]  Expected CoPay:       CoPay Card Available:      Foundation Assistance Needed:    Which Pharmacy is filling the prescription (Not needed for infusion/clinic administered): [facility] - [address] [place] [address] IVANNA  Pharmacy Notified:    Patient Notified:      HAILEE Avery, [facility]  Specialty Pharmacy Clinic Liaison     [facility]    [email or web address]     Phone: [phone]  Fax: [phone]

## 2024-02-09 ENCOUNTER — TELEPHONE (OUTPATIENT)
Dept: RHEUMATOLOGY | Facility: CLINIC | Age: 49
End: 2024-02-09
Payer: COMMERCIAL

## 2024-02-09 NOTE — TELEPHONE ENCOUNTER
PA Initiation    Medication: ORENCIA 125 MG/ML SC [employer]  Insurance Company: [employer] - Phone [phone] Fax [phone]  Pharmacy Filling the Rx: [facility] - [place]  Filling Pharmacy Phone:    Filling Pharmacy Fax:    Start Date: 2/9/2024   [id number])      HAILEE Aevry, [facility]  Specialty Pharmacy Clinic Liaison     [facility]    [email or web address]     Phone: [phone]  Fax: [phone]

## 2024-02-12 NOTE — TELEPHONE ENCOUNTER
Prior Authorization Approval    Medication: ORENCIA 125 MG/ML SC SOSY  Authorization Effective Date: 2/12/2024  Authorization Expiration Date: 2/9/2025  Approved Dose/Quantity: 4mL  Reference #: [id number]   Insurance Company: [employer] - Phone [phone] Fax [phone]  Expected CoPay: $    CoPay Card Available: No    Financial Assistance Needed: NA  Which Pharmacy is filling the prescription: [facility]       HAILEE Avery, [facility]  Specialty Pharmacy Clinic Liaison     [facility]    [email or web address]     Phone: [phone]  Fax: [phone]

## 2024-02-26 ENCOUNTER — TELEPHONE (OUTPATIENT)
Dept: RHEUMATOLOGY | Facility: CLINIC | Age: 49
End: 2024-02-26
Payer: COMMERCIAL

## 2024-02-26 NOTE — TELEPHONE ENCOUNTER
Keep getting request  for PA sent to plan again    [id number])    HAILEE Avery, [facility]  Specialty Pharmacy Clinic Liaison     [facility]    [email or web address]     Phone: [phone]  Fax: [phone]